# Patient Record
Sex: FEMALE | Race: ASIAN | NOT HISPANIC OR LATINO | Employment: FULL TIME | URBAN - METROPOLITAN AREA
[De-identification: names, ages, dates, MRNs, and addresses within clinical notes are randomized per-mention and may not be internally consistent; named-entity substitution may affect disease eponyms.]

---

## 2017-07-04 ENCOUNTER — OFFICE VISIT - HEALTHEAST (OUTPATIENT)
Dept: FAMILY MEDICINE | Facility: CLINIC | Age: 32
End: 2017-07-04

## 2017-07-04 DIAGNOSIS — R51.9 RIGHT FACIAL PAIN: ICD-10-CM

## 2018-03-26 ENCOUNTER — SURGERY - HEALTHEAST (OUTPATIENT)
Dept: OBGYN | Facility: CLINIC | Age: 33
End: 2018-03-26

## 2018-03-26 ENCOUNTER — ANESTHESIA - HEALTHEAST (OUTPATIENT)
Dept: SURGERY | Facility: CLINIC | Age: 33
End: 2018-03-26

## 2018-03-26 ENCOUNTER — SURGERY - HEALTHEAST (OUTPATIENT)
Dept: SURGERY | Facility: CLINIC | Age: 33
End: 2018-03-26

## 2018-03-26 ASSESSMENT — MIFFLIN-ST. JEOR: SCORE: 1636.62

## 2018-03-29 ENCOUNTER — HOME CARE/HOSPICE - HEALTHEAST (OUTPATIENT)
Dept: HOME HEALTH SERVICES | Facility: HOME HEALTH | Age: 33
End: 2018-03-29

## 2018-03-31 ENCOUNTER — HOME CARE/HOSPICE - HEALTHEAST (OUTPATIENT)
Dept: HOME HEALTH SERVICES | Facility: HOME HEALTH | Age: 33
End: 2018-03-31

## 2018-03-31 ENCOUNTER — COMMUNICATION - HEALTHEAST (OUTPATIENT)
Dept: SCHEDULING | Facility: CLINIC | Age: 33
End: 2018-03-31

## 2018-04-03 ENCOUNTER — COMMUNICATION - HEALTHEAST (OUTPATIENT)
Dept: OBGYN | Facility: CLINIC | Age: 33
End: 2018-04-03

## 2018-09-05 ENCOUNTER — OFFICE VISIT - HEALTHEAST (OUTPATIENT)
Dept: FAMILY MEDICINE | Facility: CLINIC | Age: 33
End: 2018-09-05

## 2018-09-05 DIAGNOSIS — N89.8 VAGINAL ITCHING: ICD-10-CM

## 2018-09-05 DIAGNOSIS — R30.9 PAINFUL URINATION: ICD-10-CM

## 2018-09-05 LAB
ALBUMIN UR-MCNC: NEGATIVE MG/DL
APPEARANCE UR: CLEAR
BACTERIA #/AREA URNS HPF: ABNORMAL HPF
BILIRUB UR QL STRIP: NEGATIVE
CLUE CELLS: NORMAL
COLOR UR AUTO: YELLOW
GLUCOSE UR STRIP-MCNC: NEGATIVE MG/DL
HGB UR QL STRIP: ABNORMAL
KETONES UR STRIP-MCNC: NEGATIVE MG/DL
LEUKOCYTE ESTERASE UR QL STRIP: ABNORMAL
NITRATE UR QL: NEGATIVE
PH UR STRIP: 7 [PH] (ref 5–8)
RBC #/AREA URNS AUTO: ABNORMAL HPF
SP GR UR STRIP: 1.01 (ref 1–1.03)
SQUAMOUS #/AREA URNS AUTO: ABNORMAL LPF
TRICHOMONAS, WET PREP: NORMAL
UROBILINOGEN UR STRIP-ACNC: ABNORMAL
WBC #/AREA URNS AUTO: ABNORMAL HPF
YEAST, WET PREP: NORMAL

## 2018-09-07 ENCOUNTER — COMMUNICATION - HEALTHEAST (OUTPATIENT)
Dept: FAMILY MEDICINE | Facility: CLINIC | Age: 33
End: 2018-09-07

## 2018-09-07 ENCOUNTER — AMBULATORY - HEALTHEAST (OUTPATIENT)
Dept: FAMILY MEDICINE | Facility: CLINIC | Age: 33
End: 2018-09-07

## 2018-09-07 DIAGNOSIS — R30.9 PAINFUL URINATION: ICD-10-CM

## 2018-09-08 LAB — BACTERIA SPEC CULT: ABNORMAL

## 2018-09-09 ENCOUNTER — COMMUNICATION - HEALTHEAST (OUTPATIENT)
Dept: FAMILY MEDICINE | Facility: CLINIC | Age: 33
End: 2018-09-09

## 2018-09-10 ENCOUNTER — OFFICE VISIT - HEALTHEAST (OUTPATIENT)
Dept: FAMILY MEDICINE | Facility: CLINIC | Age: 33
End: 2018-09-10

## 2018-09-10 DIAGNOSIS — N39.0 URINARY TRACT INFECTION: ICD-10-CM

## 2018-09-10 DIAGNOSIS — R30.9 PAINFUL URINATION: ICD-10-CM

## 2018-09-19 ENCOUNTER — AMBULATORY - HEALTHEAST (OUTPATIENT)
Dept: LAB | Facility: CLINIC | Age: 33
End: 2018-09-19

## 2018-09-19 DIAGNOSIS — N39.0 URINARY TRACT INFECTION: ICD-10-CM

## 2018-09-20 LAB — BACTERIA SPEC CULT: NO GROWTH

## 2018-10-04 ENCOUNTER — OFFICE VISIT - HEALTHEAST (OUTPATIENT)
Dept: FAMILY MEDICINE | Facility: CLINIC | Age: 33
End: 2018-10-04

## 2018-10-04 DIAGNOSIS — K62.5 RECTAL BLEEDING: ICD-10-CM

## 2018-10-09 ENCOUNTER — RECORDS - HEALTHEAST (OUTPATIENT)
Dept: ADMINISTRATIVE | Facility: OTHER | Age: 33
End: 2018-10-09

## 2018-11-20 ENCOUNTER — OFFICE VISIT - HEALTHEAST (OUTPATIENT)
Dept: FAMILY MEDICINE | Facility: CLINIC | Age: 33
End: 2018-11-20

## 2018-11-20 DIAGNOSIS — R10.11 RUQ ABDOMINAL PAIN: ICD-10-CM

## 2018-11-20 DIAGNOSIS — R10.9 FLANK PAIN: ICD-10-CM

## 2018-11-21 ENCOUNTER — HOSPITAL ENCOUNTER (OUTPATIENT)
Dept: CT IMAGING | Facility: CLINIC | Age: 33
Discharge: HOME | End: 2018-11-21
Attending: FAMILY MEDICINE

## 2018-11-21 ENCOUNTER — HOSPITAL ENCOUNTER (OUTPATIENT)
Dept: ULTRASOUND IMAGING | Facility: CLINIC | Age: 33
Discharge: HOME | End: 2018-11-21
Attending: FAMILY MEDICINE

## 2018-11-21 DIAGNOSIS — R10.11 RUQ ABDOMINAL PAIN: ICD-10-CM

## 2018-11-21 DIAGNOSIS — R10.9 FLANK PAIN: ICD-10-CM

## 2019-02-05 ENCOUNTER — OFFICE VISIT - HEALTHEAST (OUTPATIENT)
Dept: FAMILY MEDICINE | Facility: CLINIC | Age: 34
End: 2019-02-05

## 2019-02-05 DIAGNOSIS — H92.01 RIGHT EAR PAIN: ICD-10-CM

## 2019-03-27 ENCOUNTER — COMMUNICATION - HEALTHEAST (OUTPATIENT)
Dept: FAMILY MEDICINE | Facility: CLINIC | Age: 34
End: 2019-03-27

## 2019-03-27 ENCOUNTER — OFFICE VISIT - HEALTHEAST (OUTPATIENT)
Dept: FAMILY MEDICINE | Facility: CLINIC | Age: 34
End: 2019-03-27

## 2019-03-27 DIAGNOSIS — R30.0 DYSURIA: ICD-10-CM

## 2019-03-27 DIAGNOSIS — N30.00 ACUTE CYSTITIS WITHOUT HEMATURIA: ICD-10-CM

## 2019-03-27 LAB
ALBUMIN UR-MCNC: NEGATIVE MG/DL
APPEARANCE UR: CLEAR
BACTERIA #/AREA URNS HPF: ABNORMAL HPF
BILIRUB UR QL STRIP: NEGATIVE
COLOR UR AUTO: YELLOW
GLUCOSE UR STRIP-MCNC: NEGATIVE MG/DL
HGB UR QL STRIP: ABNORMAL
KETONES UR STRIP-MCNC: NEGATIVE MG/DL
LEUKOCYTE ESTERASE UR QL STRIP: ABNORMAL
NITRATE UR QL: NEGATIVE
PH UR STRIP: 6.5 [PH] (ref 5–8)
RBC #/AREA URNS AUTO: ABNORMAL HPF
SP GR UR STRIP: <=1.005 (ref 1–1.03)
SQUAMOUS #/AREA URNS AUTO: ABNORMAL LPF
UROBILINOGEN UR STRIP-ACNC: ABNORMAL
WBC #/AREA URNS AUTO: ABNORMAL HPF

## 2019-03-28 LAB — BACTERIA SPEC CULT: NO GROWTH

## 2019-06-11 ENCOUNTER — OFFICE VISIT - HEALTHEAST (OUTPATIENT)
Dept: FAMILY MEDICINE | Facility: CLINIC | Age: 34
End: 2019-06-11

## 2019-06-11 DIAGNOSIS — J02.0 STREP THROAT: ICD-10-CM

## 2019-06-11 DIAGNOSIS — R07.0 THROAT PAIN: ICD-10-CM

## 2019-06-11 LAB — DEPRECATED S PYO AG THROAT QL EIA: ABNORMAL

## 2020-05-02 ENCOUNTER — OFFICE VISIT - HEALTHEAST (OUTPATIENT)
Dept: FAMILY MEDICINE | Facility: CLINIC | Age: 35
End: 2020-05-02

## 2020-05-02 DIAGNOSIS — K12.30 STOMATITIS AND MUCOSITIS: ICD-10-CM

## 2020-05-02 DIAGNOSIS — K12.1 STOMATITIS AND MUCOSITIS: ICD-10-CM

## 2020-08-20 ENCOUNTER — OFFICE VISIT - HEALTHEAST (OUTPATIENT)
Dept: FAMILY MEDICINE | Facility: CLINIC | Age: 35
End: 2020-08-20

## 2020-08-20 DIAGNOSIS — Z00.00 ROUTINE GENERAL MEDICAL EXAMINATION AT A HEALTH CARE FACILITY: ICD-10-CM

## 2020-08-20 LAB
CHOLEST SERPL-MCNC: 162 MG/DL
FASTING STATUS PATIENT QL REPORTED: YES
FASTING STATUS PATIENT QL REPORTED: YES
GLUCOSE BLD-MCNC: 97 MG/DL (ref 70–99)
HDLC SERPL-MCNC: 63 MG/DL
LDLC SERPL CALC-MCNC: 84 MG/DL
TRIGL SERPL-MCNC: 75 MG/DL

## 2020-08-20 ASSESSMENT — MIFFLIN-ST. JEOR: SCORE: 1480.47

## 2020-08-21 LAB
HPV SOURCE: NORMAL
HUMAN PAPILLOMA VIRUS 16 DNA: NEGATIVE
HUMAN PAPILLOMA VIRUS 18 DNA: NEGATIVE
HUMAN PAPILLOMA VIRUS FINAL DIAGNOSIS: NORMAL
HUMAN PAPILLOMA VIRUS OTHER HR: NEGATIVE
SPECIMEN DESCRIPTION: NORMAL

## 2020-08-28 ENCOUNTER — OFFICE VISIT - HEALTHEAST (OUTPATIENT)
Dept: FAMILY MEDICINE | Facility: CLINIC | Age: 35
End: 2020-08-28

## 2020-08-28 DIAGNOSIS — R42 DIZZINESS: ICD-10-CM

## 2020-08-28 LAB
ATRIAL RATE - MUSE: 75 BPM
DIASTOLIC BLOOD PRESSURE - MUSE: NORMAL
ERYTHROCYTE [DISTWIDTH] IN BLOOD BY AUTOMATED COUNT: 12.2 % (ref 11–14.5)
FASTING STATUS PATIENT QL REPORTED: NO
GLUCOSE BLD-MCNC: 109 MG/DL (ref 74–125)
HCT VFR BLD AUTO: 37.5 % (ref 35–47)
HGB BLD-MCNC: 12.4 G/DL (ref 12–16)
INTERPRETATION ECG - MUSE: NORMAL
MCH RBC QN AUTO: 26.1 PG (ref 27–34)
MCHC RBC AUTO-ENTMCNC: 33.2 G/DL (ref 32–36)
MCV RBC AUTO: 79 FL (ref 80–100)
P AXIS - MUSE: 69 DEGREES
PLATELET # BLD AUTO: 318 THOU/UL (ref 140–440)
PMV BLD AUTO: 7.7 FL (ref 7–10)
PR INTERVAL - MUSE: 136 MS
QRS DURATION - MUSE: 90 MS
QT - MUSE: 384 MS
QTC - MUSE: 428 MS
R AXIS - MUSE: 88 DEGREES
RBC # BLD AUTO: 4.76 MILL/UL (ref 3.8–5.4)
SYSTOLIC BLOOD PRESSURE - MUSE: NORMAL
T AXIS - MUSE: 53 DEGREES
VENTRICULAR RATE- MUSE: 75 BPM
WBC: 6.6 THOU/UL (ref 4–11)

## 2020-09-17 ENCOUNTER — OFFICE VISIT - HEALTHEAST (OUTPATIENT)
Dept: FAMILY MEDICINE | Facility: CLINIC | Age: 35
End: 2020-09-17

## 2020-09-17 DIAGNOSIS — R42 DIZZINESS: ICD-10-CM

## 2020-09-17 LAB
ERYTHROCYTE [DISTWIDTH] IN BLOOD BY AUTOMATED COUNT: 11.7 % (ref 11–14.5)
FERRITIN SERPL-MCNC: 8 NG/ML (ref 10–130)
HCT VFR BLD AUTO: 37.7 % (ref 35–47)
HGB BLD-MCNC: 12.5 G/DL (ref 12–16)
MCH RBC QN AUTO: 25.9 PG (ref 27–34)
MCHC RBC AUTO-ENTMCNC: 33 G/DL (ref 32–36)
MCV RBC AUTO: 78 FL (ref 80–100)
PLATELET # BLD AUTO: 295 THOU/UL (ref 140–440)
PMV BLD AUTO: 7.7 FL (ref 7–10)
RBC # BLD AUTO: 4.81 MILL/UL (ref 3.8–5.4)
VIT B12 SERPL-MCNC: 303 PG/ML (ref 213–816)
WBC: 5.1 THOU/UL (ref 4–11)

## 2020-09-18 ENCOUNTER — COMMUNICATION - HEALTHEAST (OUTPATIENT)
Dept: SCHEDULING | Facility: CLINIC | Age: 35
End: 2020-09-18

## 2020-12-03 ENCOUNTER — OFFICE VISIT - HEALTHEAST (OUTPATIENT)
Dept: FAMILY MEDICINE | Facility: CLINIC | Age: 35
End: 2020-12-03

## 2020-12-03 DIAGNOSIS — D51.3 OTHER DIETARY VITAMIN B12 DEFICIENCY ANEMIA: ICD-10-CM

## 2020-12-03 LAB
FERRITIN SERPL-MCNC: 51 NG/ML (ref 10–130)
VIT B12 SERPL-MCNC: 1926 PG/ML (ref 213–816)

## 2020-12-04 ENCOUNTER — COMMUNICATION - HEALTHEAST (OUTPATIENT)
Dept: FAMILY MEDICINE | Facility: CLINIC | Age: 35
End: 2020-12-04

## 2020-12-08 ENCOUNTER — COMMUNICATION - HEALTHEAST (OUTPATIENT)
Dept: FAMILY MEDICINE | Facility: CLINIC | Age: 35
End: 2020-12-08

## 2020-12-11 ENCOUNTER — COMMUNICATION - HEALTHEAST (OUTPATIENT)
Dept: SCHEDULING | Facility: CLINIC | Age: 35
End: 2020-12-11

## 2020-12-16 ENCOUNTER — OFFICE VISIT - HEALTHEAST (OUTPATIENT)
Dept: FAMILY MEDICINE | Facility: CLINIC | Age: 35
End: 2020-12-16

## 2020-12-16 DIAGNOSIS — H81.11 BENIGN PAROXYSMAL POSITIONAL VERTIGO OF RIGHT EAR: ICD-10-CM

## 2020-12-16 DIAGNOSIS — F41.9 ANXIETY: ICD-10-CM

## 2020-12-16 RX ORDER — MECLIZINE HYDROCHLORIDE 25 MG/1
25 TABLET ORAL 3 TIMES DAILY PRN
Qty: 30 TABLET | Refills: 1 | Status: SHIPPED | OUTPATIENT
Start: 2020-12-16 | End: 2021-09-27

## 2021-05-27 NOTE — TELEPHONE ENCOUNTER
Call placed to patient, advised appt for evaluation. She will go to [facility].  Beatriz Beltran [facility] CMT

## 2021-05-27 NOTE — PROGRESS NOTES
Assessment/Plan:   Dysuria  Acute cystitis without hematuria  Worsening sxs of acute cystitis without sign of systemic or complicated infection. UA consistent  I discussed red flag symptoms, return precautions to clinic/ER and follow up care with patient/parent.  Expected clinical course, symptomatic cares advised. Questions answered. Patient/parent amenable with plan.  - Urinalysis-UC if Indicated  - Culture, Urine  - sulfamethoxazole-trimethoprim (BACTRIM DS) 800-160 mg per tablet; Take 1 tablet by mouth 2 (two) times a day for 10 days.  Dispense: 20 tablet; Refill: 0    Urine culture is pending - we will call if a change is needed  Continue to drink water, sitz baths, vaseline  BActrim DS (sulfa-trim) twice a day for 7-10 days  Yogurt or probiotics  Pyridium or Azo brand urinary anesthetic may help with pain until antibiotic kicks in. Ask the pharmacist for help finding it if needed    Subjective:      George Hill is a 33 y.o. female who presents with dysuria. She has noticed mild burning at the end of urination for 3-4 days, it is now getting worse. She is also now experiencing more frequency and urgency of urination. No blood. No vaginal sxs. LMP 3/9/19, she is s/p TL and denies possibility of pregnacy. No fever or chills, no flank pain or abdominal pain. No N/V/D or constipation. She otherwise feels well. No-smoker.     No Known Allergies    Current Outpatient Medications on File Prior to Visit   Medication Sig Dispense Refill     cholecalciferol, vitamin D3, 2,000 unit Tab Take 2,000 Units by mouth.       No current facility-administered medications on file prior to visit.      There is no problem list on file for this patient.      Objective:     /75 (Patient Site: Right Arm, Patient Position: Sitting, Cuff Size: Adult Regular)   Pulse 73   Temp 98.1  F (36.7  C) (Oral)   Resp 18   Wt 163 lb (73.9 kg)   LMP 03/09/2019   SpO2 100%   BMI 24.78 kg/m      Physical  General Appearance: Alert,  cooperative, no distress  Head: Normocephalic, without obvious abnormality, atraumatic  Eyes: Conjunctivae are normal.   Nose: No significant congestion.  Abdomen: Soft, non-tender, no CVA tenderness with percussion  Skin: no rashes or lesions  Psychiatric: Patient has a normal mood and affect.        Recent Results (from the past 24 hour(s))   Urinalysis-UC if Indicated   Result Value Ref Range    Color, UA Yellow Colorless, Yellow, Straw, Light Yellow    Clarity, UA Clear Clear    Glucose, UA Negative Negative    Bilirubin, UA Negative Negative    Ketones, UA Negative Negative    Specific Gravity, UA <=1.005 1.005 - 1.030    Blood, UA Small (!) Negative    pH, UA 6.5 5.0 - 8.0    Protein, UA Negative Negative mg/dL    Urobilinogen, UA 0.2 E.U./dL 0.2 E.U./dL, 1.0 E.U./dL    Nitrite, UA Negative Negative    Leukocytes, UA Small (!) Negative    Bacteria, UA None Seen None Seen hpf    RBC, UA None Seen None Seen, 0-2 hpf    WBC, UA 0-5 None Seen, 0-5 hpf    Squam Epithel, UA 0-5 None Seen, 0-5 lpf

## 2021-05-27 NOTE — PATIENT INSTRUCTIONS - HE
Urine culture is pending - we will call if a change is needed  Continue to drink water, sitz baths, vaseline  BActrim DS (sulfa-trim) twice a day for 7-10 days  Yogurt or probiotics  Pyridium or Azo brand urinary anesthetic may help with pain until antibiotic kicks in. Ask the pharmacist for help finding it if needed

## 2021-05-27 NOTE — TELEPHONE ENCOUNTER
Orders being requested: UA/UC   Reason service is needed/diagnosis: burning with urination an urgency,  hurts to urinate, x 1 day , Please call and advise   When are orders needed by: ASAP- TODAY   Where to send Orders: EPIC - lab order future  Okay to leave detailed message?  Yes

## 2021-05-31 VITALS — BODY MASS INDEX: 24.58 KG/M2 | WEIGHT: 159.3 LBS

## 2021-06-01 VITALS — BODY MASS INDEX: 30.01 KG/M2 | WEIGHT: 198 LBS | HEIGHT: 68 IN

## 2021-06-02 VITALS — WEIGHT: 171.1 LBS | BODY MASS INDEX: 26.02 KG/M2

## 2021-06-02 VITALS — BODY MASS INDEX: 26.67 KG/M2 | WEIGHT: 175.38 LBS

## 2021-06-02 VITALS — WEIGHT: 173 LBS | BODY MASS INDEX: 26.3 KG/M2

## 2021-06-02 VITALS — BODY MASS INDEX: 24.78 KG/M2 | WEIGHT: 163 LBS

## 2021-06-02 VITALS — WEIGHT: 170.8 LBS | BODY MASS INDEX: 25.97 KG/M2

## 2021-06-02 VITALS — WEIGHT: 175 LBS | BODY MASS INDEX: 26.61 KG/M2

## 2021-06-03 VITALS — BODY MASS INDEX: 24.18 KG/M2 | WEIGHT: 159 LBS

## 2021-06-04 VITALS
OXYGEN SATURATION: 100 % | RESPIRATION RATE: 16 BRPM | BODY MASS INDEX: 24.94 KG/M2 | TEMPERATURE: 98.7 F | SYSTOLIC BLOOD PRESSURE: 119 MMHG | WEIGHT: 164 LBS | HEART RATE: 98 BPM | DIASTOLIC BLOOD PRESSURE: 78 MMHG

## 2021-06-04 VITALS
HEART RATE: 88 BPM | WEIGHT: 162.7 LBS | BODY MASS INDEX: 24.66 KG/M2 | DIASTOLIC BLOOD PRESSURE: 74 MMHG | HEIGHT: 68 IN | SYSTOLIC BLOOD PRESSURE: 108 MMHG | OXYGEN SATURATION: 100 %

## 2021-06-04 VITALS
OXYGEN SATURATION: 98 % | BODY MASS INDEX: 24.75 KG/M2 | TEMPERATURE: 98.8 F | WEIGHT: 164 LBS | SYSTOLIC BLOOD PRESSURE: 112 MMHG | RESPIRATION RATE: 17 BRPM | DIASTOLIC BLOOD PRESSURE: 75 MMHG | HEART RATE: 72 BPM

## 2021-06-05 VITALS
TEMPERATURE: 98.2 F | BODY MASS INDEX: 24.9 KG/M2 | WEIGHT: 165 LBS | HEART RATE: 84 BPM | DIASTOLIC BLOOD PRESSURE: 74 MMHG | SYSTOLIC BLOOD PRESSURE: 108 MMHG | OXYGEN SATURATION: 100 %

## 2021-06-05 VITALS
HEART RATE: 88 BPM | DIASTOLIC BLOOD PRESSURE: 70 MMHG | BODY MASS INDEX: 24.99 KG/M2 | SYSTOLIC BLOOD PRESSURE: 106 MMHG | WEIGHT: 165.56 LBS | OXYGEN SATURATION: 100 %

## 2021-06-05 VITALS
BODY MASS INDEX: 25.08 KG/M2 | TEMPERATURE: 98.3 F | OXYGEN SATURATION: 100 % | SYSTOLIC BLOOD PRESSURE: 104 MMHG | WEIGHT: 166.19 LBS | DIASTOLIC BLOOD PRESSURE: 62 MMHG | HEART RATE: 85 BPM

## 2021-06-09 ENCOUNTER — OFFICE VISIT - HEALTHEAST (OUTPATIENT)
Dept: FAMILY MEDICINE | Facility: CLINIC | Age: 36
End: 2021-06-09

## 2021-06-09 DIAGNOSIS — R30.0 DYSURIA: ICD-10-CM

## 2021-06-09 LAB
ALBUMIN UR-MCNC: NEGATIVE G/DL
APPEARANCE UR: CLEAR
BILIRUB UR QL STRIP: NEGATIVE
COLOR UR AUTO: YELLOW
GLUCOSE UR STRIP-MCNC: NEGATIVE MG/DL
HGB UR QL STRIP: NEGATIVE
KETONES UR STRIP-MCNC: NEGATIVE MG/DL
LEUKOCYTE ESTERASE UR QL STRIP: NEGATIVE
NITRATE UR QL: NEGATIVE
PH UR STRIP: 7 [PH] (ref 5–8)
SP GR UR STRIP: 1.01 (ref 1–1.03)
UROBILINOGEN UR STRIP-ACNC: NORMAL

## 2021-06-10 LAB — BACTERIA SPEC CULT: NO GROWTH

## 2021-06-11 NOTE — PROGRESS NOTES
Assessment & Plan:       1. Dizziness  Electrocardiogram Perform and Read    Glucose    HM2(CBC w/o Differential)    fluticasone propionate (FLONASE) 50 mcg/actuation nasal spray      Medical Decision Making  Patient presents with acute onset dizziness for 2 to 3 days most likely secondary to sinus congestion versus eustachian tube dysfunction.  Treat patient with a short course of nasal steroids and over-the-counter oral decongestants.  Had initial concerns for possible cardiac etiology versus BPPV versus glycemia versus anemia.  ECG showed normal sinus rhythm.  Rome-Hallpike maneuver was negative bilaterally and patient showed no signs of nystagmus on physical exam.  Did labs which showed normal glucose levels and normal hemoglobin.  Patient further has no signs of infection with no signs of otitis media and no fevers.  Discussed signs of worsening symptoms and when to follow-up with PCP if no symptom improvement.      Patient Instructions   You were seen today for sinus congestion and/or pain. This is likely due to a viral illness.    Symptoms management:  - May use Tylenol or Ibuprofen for discomfort and/or fever if present  - May try saline irrigation to relieve congestion (see instructions below)  - Use of nasal steroids (Flonase) as prescribed  - If you are experiencing ear fullness, may try an oral decongestant such as sudafed    Reasons to come back for re-evaluation:  - Develop a fever of 100.4F or current fever worsens  - Sudden and severe pain in the face and head  - Troubles seeing or double vision  - Swelling or redness around one or both eyes  - Sfiff neck  - Symptoms have not improved after 7 days    Buffered normal saline nasal irrigation   The benefits   1. Saline (saltwater) washes the mucus and irritants from your nose.   2. The sinus passages are moisturized.   3. Studies have also shown that a nasal irrigation improves cell function (the cells that move the mucus work better).   The recipe    Use a one-quart glass jar that is thoroughly cleansed.   You may use a large medical syringe (30 cc), water pick with an irrigation tip (preferred method), squeeze bottle, or Neti pot. Do not use a baby bulb syringe. The syringe or pick should be sterilized frequently or replaced every two to three weeks to avoid contamination and infection.   Fill with water that has been distilled, previously boiled, or otherwise sterilized. Plain tap water is not recommended, because it is not necessarily sterile.   Add 1 to 1  heaping teaspoons of pickling/anabel salt. Do not use table salt, because it contains a large number of additives.   Add 1 teaspoon of baking soda (pure bicarbonate).   Mix ingredients together, and store at room temperature. Discard after one week.   You may also make up a solution from premixed packets that are commercially prepared specifically for nasal irrigation.   The instructions   Irrigate your nose with saline one to two times per day.   If you have been told to use nasal medication, you should always use your saline solution first. The nasal medication is much more effective when sprayed onto clean nasal membranes, and the spray will reach deeper into the nose.   Pour the amount of fluid you plan to use into a clean bowl. Do not put your used syringe back into the storage container, because it contaminates your solution.   You may warm the solution slightly in the microwave, but be sure that the solution is not hot.   Bend over the sink (some people do this in the shower) and squirt the solution into each side of your nose, aiming the stream toward the back of your head, not the top of your head. The solution should flow into one nostril and out of the other, but it will not harm you if you swallow a little.   Some people experience a little burning sensation the first few times they use buffered saline solution, but this usually goes away after they adapt to it.             Subjective:      "  George Hill is a 35 y.o. female here for evaluation of dizziness.  Onset of symptoms was 3 days ago.  Patient describes dizziness as imbalance.  However, she has no difficulties walking.  She denies lightheadedness and room spinning sensation.  She states that the sensation is \"always there\".  She notes that leaning forward makes her symptoms worse.  Associated symptoms include pressure and pain in the right ear.  Patient otherwise denies fevers, headaches, sinus congestion, vision changes, shortness of breath, and chest pains.  She has had these symptoms before that self resolved in 24 hours.  She has never been evaluated for the symptoms.    The following portions of the patient's history were reviewed and updated as appropriate: allergies, current medications and problem list.    Review of Systems  A 12 point comprehensive review of systems was negative except as noted.     Allergies  No Known Allergies    Family History   Problem Relation Age of Onset     Colon cancer Mother      Brain cancer Father        Social History     Socioeconomic History     Marital status:      Spouse name: None     Number of children: None     Years of education: None     Highest education level: None   Occupational History     None   Social Needs     Financial resource strain: None     Food insecurity     Worry: None     Inability: None     Transportation needs     Medical: None     Non-medical: None   Tobacco Use     Smoking status: Never Smoker     Smokeless tobacco: Never Used   Substance and Sexual Activity     Alcohol use: No     Drug use: No     Sexual activity: Yes     Partners: Male   Lifestyle     Physical activity     Days per week: None     Minutes per session: None     Stress: None   Relationships     Social connections     Talks on phone: None     Gets together: None     Attends [employer] service: None     Active member of club or organization: None     Attends meetings of clubs or organizations: None     " Relationship status: None     Intimate partner violence     Fear of current or ex partner: None     Emotionally abused: None     Physically abused: None     Forced sexual activity: None   Other Topics Concern     None   Social History Narrative     None         Objective:       /75   Pulse 72   Temp 98.8  F (37.1  C)   Resp 17   Wt 164 lb (74.4 kg)   LMP 07/31/2020   SpO2 98%   Breastfeeding No   BMI 24.75 kg/m    General appearance: alert, appears stated age, cooperative, no distress and non-toxic  Head: Normocephalic, without obvious abnormality, atraumatic  Ears: TMs intact with serous fluid and moderate bulging bilaterally, no erythema; external ears normal  Nose: no discharge  Throat: lips, mucosa, and tongue normal; teeth and gums normal  Neck: no adenopathy and supple, symmetrical, trachea midline  Lungs: clear to auscultation bilaterally  Heart: regular rate and rhythm, S1, S2 normal, no murmur, click, rub or gallop  Neurologic: Alert and oriented X 3, normal strength and tone. Normal symmetric reflexes. Normal coordination and gait.  Maximiliano-Hallpike maneuver was negative bilaterally.     Lab Results    Recent Results (from the past 24 hour(s))   Electrocardiogram Perform and Read   Result Value Ref Range    SYSTOLIC BLOOD PRESSURE      DIASTOLIC BLOOD PRESSURE      VENTRICULAR RATE 75 BPM    ATRIAL RATE 75 BPM    P-R INTERVAL 136 ms    QRS DURATION 90 ms    Q-T INTERVAL 384 ms    QTC CALCULATION (BEZET) 428 ms    P Axis 69 degrees    R AXIS 88 degrees    T AXIS 53 degrees    MUSE DIAGNOSIS       Normal sinus rhythm  Normal ECG  No previous ECGs available     Glucose   Result Value Ref Range    Glucose 109 74 - 125 mg/dL    Patient Fasting > 8hrs? No    HM2(CBC w/o Differential)   Result Value Ref Range    WBC 6.6 4.0 - 11.0 thou/uL    RBC 4.76 3.80 - 5.40 mill/uL    Hemoglobin 12.4 12.0 - 16.0 g/dL    Hematocrit 37.5 35.0 - 47.0 %    MCV 79 (L) 80 - 100 fL    MCH 26.1 (L) 27.0 - 34.0 pg    MCHC  33.2 32.0 - 36.0 g/dL    RDW 12.2 11.0 - 14.5 %    Platelets 318 140 - 440 thou/uL    MPV 7.7 7.0 - 10.0 fL     I personally reviewed these results and discussed findings with the patient.

## 2021-06-11 NOTE — PROGRESS NOTES
"ASSESSMENT/PLAN:  George was seen today for dizziness.    Diagnoses and all orders for this visit:    Dizziness in vegetarian  Likely B12 deficiency and/or anemia  Will check labs  Advised daily B12 1000mcg   F/u in 1 month  -     HM2(CBC w/o Differential)  -     Ferritin  -     Vitamin B12    SUBJECTIVE:    George Hill is a 35 y.o. female who came in today     Dizziness \"slight spinning sensation\" even when sitting  Started 4 weeks ago.  Had 4-5 days free of symptoms but since then has been daily  Better at night  Not associated with eating  Mild nausea  Appetite is ok, no vomiting  Dizziness is manageable but not normal  Right side of face is sensitive, not painful.  Wants right ear checked.  No ear pain. Hearing ok  More burping after eating food and more farting  No change in diet.  Vegetarian diet.  No new medications, no new supplements  Was seen [facility] 8/28/20, given flonase, which she hasn't found to be helpful.  CBC showed MCV 79 and normal glucose   Tried meclizine and wasn't helpful  Tried OTC B12 (suggested by friend) and wasn't helpful  LMP was recently but can't recall exact date    Review of Systems (except those mentioned above)  Constitutional: Negative.   HENT: Negative.   Eyes: Negative.   Respiratory: Negative.   Cardiovascular: Negative.   Gastrointestinal: Negative.   Endocrine: Negative.   Genitourinary: Negative.   Musculoskeletal: Negative.   Skin: Negative.   Allergic/Immunologic: Negative.   Neurological: Negative.   Hematological: Negative.   Psychiatric/Behavioral: Negative.     There are no active problems to display for this patient.    No Known Allergies  Current Outpatient Medications   Medication Sig Dispense Refill     cholecalciferol, vitamin D3, 2,000 unit Tab Take 2,000 Units by mouth.       acetaminophen (TYLENOL) 325 MG tablet Take 650 mg by mouth every 6 (six) hours as needed for pain.       mecobalamin (B12 ACTIVE ORAL) Take by mouth.       multivitamin therapeutic " tablet Take 1 tablet by mouth daily.       No current facility-administered medications for this visit.      No past medical history on file.  Past Surgical History:   Procedure Laterality Date      SECTION, CLASSIC  2013     Social History     Socioeconomic History     Marital status:      Spouse name: None     Number of children: None     Years of education: None     Highest education level: None   Occupational History     None   Social Needs     Financial resource strain: None     Food insecurity     Worry: None     Inability: None     Transportation needs     Medical: None     Non-medical: None   Tobacco Use     Smoking status: Never Smoker     Smokeless tobacco: Never Used   Substance and Sexual Activity     Alcohol use: No     Drug use: No     Sexual activity: Yes     Partners: Male   Lifestyle     Physical activity     Days per week: None     Minutes per session: None     Stress: None   Relationships     Social connections     Talks on phone: None     Gets together: None     Attends Scientologist service: None     Active member of club or organization: None     Attends meetings of clubs or organizations: None     Relationship status: None     Intimate partner violence     Fear of current or ex partner: None     Emotionally abused: None     Physically abused: None     Forced sexual activity: None   Other Topics Concern     None   Social History Narrative     None     Family History   Problem Relation Age of Onset     Colon cancer Mother      Brain cancer Father          OBJECTIVE:    Vitals:    20 1221   BP: 106/70   Pulse: 88   SpO2: 100%   Weight: 165 lb 9 oz (75.1 kg)     Body mass index is 24.99 kg/m .    Physical Exam:  Constitutional: Patient was oriented to person, place, and time. Patient appeared well-developed and well-nourished. No distress.   Head: Normocephalic and atraumatic.   Right Ear: External ear normal. Normal TM  Left Ear: External ear normal. Normal TM  Nose: Nose normal.    Mouth/Throat: Oropharynx was clear and moist. No oropharyngeal exudate.   Eyes: Conjunctivae and EOM were normal. Pupils were equal, round, and reactive to light. Right eye exhibited no discharge. Left eye exhibited no discharge. No scleral icterus.   Neck: Neck supple. No JVD present. No tracheal deviation present. No thyromegaly present.   Lymphadenopathy:  Patient has no cervical adenopathy.   Cardiovascular: Normal rate, regular rhythm, normal heart sounds and intact distal pulses. No murmur heard.   Pulmonary/Chest: Effort normal and breath sounds normal. No stridor. No respiratory distress. Patient had no wheezes, no rales, exhibits no tenderness.   Neurological: Patient was alert and oriented to person, place, and time. Patient had normal reflexes. No cranial nerve deficit. Coordination normal. No nystagmus  Skin: Skin was warm and dry. No rash noted. Patient was not diaphoretic. No erythema. No pallor.       Results for orders placed or performed in visit on 09/17/20   HM2(CBC w/o Differential)   Result Value Ref Range    WBC 5.1 4.0 - 11.0 thou/uL    RBC 4.81 3.80 - 5.40 mill/uL    Hemoglobin 12.5 12.0 - 16.0 g/dL    Hematocrit 37.7 35.0 - 47.0 %    MCV 78 (L) 80 - 100 fL    MCH 25.9 (L) 27.0 - 34.0 pg    MCHC 33.0 32.0 - 36.0 g/dL    RDW 11.7 11.0 - 14.5 %    Platelets 295 140 - 440 thou/uL    MPV 7.7 7.0 - 10.0 fL

## 2021-06-11 NOTE — TELEPHONE ENCOUNTER
COVID 19 Nurse Triage Plan/Patient Instructions    Please be aware that novel coronavirus (COVID-19) may be circulating in the community. If you develop symptoms such as fever, cough, or SOB or if you have concerns about the presence of another infection including coronavirus (COVID-19), please contact your health care provider or visit [email or web address].     Disposition/Instructions    Home care recommended. Follow home care protocol based instructions.    Thank you for taking steps to prevent the spread of this virus.  o Limit your contact with others.  o Wear a simple mask to cover your cough.  o Wash your hands well and often.    Resources    [facility]: About COVID-19: [email or web address]    CDC: What to Do If You're Sick: www.cdc.gov/coronavirus/2019-ncov/about/steps-when-sick.html    CDC: Ending Home Isolation: www.cdc.gov/coronavirus/2019-ncov/hcp/disposition-in-home-patients.html     CDC: Caring for Someone: www.cdc.gov/coronavirus/2019-ncov/if-you-are-sick/care-for-someone.html     [facility]: Interim Guidance for Hospital Discharge to Home: www.[facility].[place]/diseases/coronavirus/hcp/hospdischarge.pdf    [facility] clinical trials (COVID-19 research studies): [email or web address].[email or web address]-clinical-trials     Below are the COVID-19 hotlines at the [place] Department [facility] ([facility]). Interpreters are available.   o For health questions: Call 759-965-4964 or 1-824.942.2093 (7 a.m. to 7 p.m.)  o For questions about schools and childcare: Call 891-477-4047 or 1-186.318.8211 (7 a.m. to 7 p.m.)         RN triage   Call from pt   Pt states she was seen in clinic this week for dizziness-- had labs drawn and requesting results  Reviewed labs and PCP advice   Pt states she also takes vit D -- no note in chart to change vit D -- pt will continue vit D - add B12 and iron  Also discussed iron and constipation and preventative measures  Pt will call back if further questions or changes in  symptoms   Malinda Guerrero RN [facility] RN triage        Reason for Disposition    Information only question and nurse able to answer    Protocols used: INFORMATION ONLY CALL - NO TRIAGE-A-[place]

## 2021-06-11 NOTE — PATIENT INSTRUCTIONS - HE
You were seen today for sinus congestion and/or pain. This is likely due to a viral illness.    Symptoms management:  - May use Tylenol or Ibuprofen for discomfort and/or fever if present  - May try saline irrigation to relieve congestion (see instructions below)  - Use of nasal steroids (Flonase) as prescribed  - If you are experiencing ear fullness, may try an oral decongestant such as sudafed    Reasons to come back for re-evaluation:  - Develop a fever of 100.4F or current fever worsens  - Sudden and severe pain in the face and head  - Troubles seeing or double vision  - Swelling or redness around one or both eyes  - Sfiff neck  - Symptoms have not improved after 7 days    Buffered normal saline nasal irrigation   The benefits   1. Saline (saltwater) washes the mucus and irritants from your nose.   2. The sinus passages are moisturized.   3. Studies have also shown that a nasal irrigation improves cell function (the cells that move the mucus work better).   The recipe   Use a one-quart glass jar that is thoroughly cleansed.   You may use a large medical syringe (30 cc), water pick with an irrigation tip (preferred method), squeeze bottle, or Neti pot. Do not use a baby bulb syringe. The syringe or pick should be sterilized frequently or replaced every two to three weeks to avoid contamination and infection.   Fill with water that has been distilled, previously boiled, or otherwise sterilized. Plain tap water is not recommended, because it is not necessarily sterile.   Add 1 to 1  heaping teaspoons of pickling/anabel salt. Do not use table salt, because it contains a large number of additives.   Add 1 teaspoon of baking soda (pure bicarbonate).   Mix ingredients together, and store at room temperature. Discard after one week.   You may also make up a solution from premixed packets that are commercially prepared specifically for nasal irrigation.   The instructions   Irrigate your nose with saline one to two  times per day.   If you have been told to use nasal medication, you should always use your saline solution first. The nasal medication is much more effective when sprayed onto clean nasal membranes, and the spray will reach deeper into the nose.   Pour the amount of fluid you plan to use into a clean bowl. Do not put your used syringe back into the storage container, because it contaminates your solution.   You may warm the solution slightly in the microwave, but be sure that the solution is not hot.   Bend over the sink (some people do this in the shower) and squirt the solution into each side of your nose, aiming the stream toward the back of your head, not the top of your head. The solution should flow into one nostril and out of the other, but it will not harm you if you swallow a little.   Some people experience a little burning sensation the first few times they use buffered saline solution, but this usually goes away after they adapt to it.

## 2021-06-11 NOTE — PROGRESS NOTES
Chief Complaint   Patient presents with     Eye Pain     right eye is aching and red started 7/2       HPI    Patient is here for 2 days of intermittent discomfort/pain at right upper face between the right lateral corner of the right eye and the ear with intermittent slight redness of the right eye. No eye pain, discharge, visual changes, headache, fever, cough, neck pain. No contact lenses. No hx of shingles.     ROS: Pertinent ROS noted in HPI.     No Known Allergies      There is no problem list on file for this patient.      Family History   Problem Relation Age of Onset     Colon cancer Mother      Brain cancer Father        Social History     Social History     Marital status:      Spouse name: N/A     Number of children: N/A     Years of education: N/A     Occupational History     Not on file.     Social History Main Topics     Smoking status: Never Smoker     Smokeless tobacco: Never Used     Alcohol use Not on file     Drug use: Not on file     Sexual activity: Not on file     Other Topics Concern     Not on file     Social History Narrative       Objective:    Vitals:    07/04/17 0835   BP: 102/70   Pulse: 78   Temp: 98.2  F (36.8  C)   SpO2: 100%       Gen:NAD  Head: normal inspection, normal palpation  Ears: normal TMs and canals bilaterally  Eyes: normal conjunctiva, eyelids bilaterally, corneas grossly clear, PERRLA, EOMI  CV: RRR,no M, R, G  Pulm: CTAB  Skin: no rash    Impression:    Right facial pain - unclear etiology, discussed differentials (TMJ, herpes zoster vs others), normal exam today.    Plan:  Reassurances  Close monitoring  Close follow up as directed.

## 2021-06-13 NOTE — PROGRESS NOTES
ASSESSMENT/PLAN:  George was seen today for discuss iron deficiency.    Diagnoses and all orders for this visit:    Other dietary vitamin B12 deficiency anemia  -     Vitamin B12  -     Ferritin  Dizziness and paresthesia symptoms are much improved  Will check labs  Continue with 1000mcg [id number] and 325mg ferrous sulfate  Further recommendations will depend on lab results    SUBJECTIVE:    George Hill is a 35 y.o. female who came in today     Since taking B12 1000mcg daily and ferrous 325mg once daily  Dizziness is better, not as frequent  Facial sensitivity has resolved  Starting back on eating meat (chicken)    Review of Systems (except those mentioned above)  Constitutional: Negative.   HENT: Negative.   Eyes: Negative.   Respiratory: Negative.   Cardiovascular: Negative.   Gastrointestinal: Negative.   Endocrine: Negative.   Genitourinary: Negative.   Musculoskeletal: Negative.   Skin: Negative.   Allergic/Immunologic: Negative.   Neurological: Negative.   Hematological: Negative.   Psychiatric/Behavioral: Negative.     There are no active problems to display for this patient.    No Known Allergies  Current Outpatient Medications   Medication Sig Dispense Refill     cholecalciferol, vitamin D3, 2,000 unit Tab Take 2,000 Units by mouth.       ferrous sulfate 65 mg elemental iron Take 1 tablet by mouth daily with breakfast.       mecobalamin (B12 ACTIVE ORAL) Take by mouth. Taking 100 mcg       No current facility-administered medications for this visit.      No past medical history on file.  Past Surgical History:   Procedure Laterality Date      SECTION, CLASSIC  2013     Social History     Socioeconomic History     Marital status:      Spouse name: None     Number of children: None     Years of education: None     Highest education level: None   Occupational History     None   Social Needs     Financial resource strain: None     Food insecurity     Worry: None     Inability: None      Transportation needs     Medical: None     Non-medical: None   Tobacco Use     Smoking status: Never Smoker     Smokeless tobacco: Never Used   Substance and Sexual Activity     Alcohol use: No     Drug use: No     Sexual activity: Yes     Partners: Male   Lifestyle     Physical activity     Days per week: None     Minutes per session: None     Stress: None   Relationships     Social connections     Talks on phone: None     Gets together: None     Attends Judaism service: None     Active member of club or organization: None     Attends meetings of clubs or organizations: None     Relationship status: None     Intimate partner violence     Fear of current or ex partner: None     Emotionally abused: None     Physically abused: None     Forced sexual activity: None   Other Topics Concern     None   Social History Narrative     None     Family History   Problem Relation Age of Onset     Colon cancer Mother      Brain cancer Father          OBJECTIVE:    Vitals:    12/03/20 1339   BP: 104/62   Patient Site: Left Arm   Patient Position: Sitting   Cuff Size: Adult Regular   Pulse: 85   Temp: 98.3  F (36.8  C)   TempSrc: Oral   SpO2: 100%   Weight: 166 lb 3 oz (75.4 kg)     Body mass index is 25.08 kg/m .    Physical Exam:  Constitutional: Patient is oriented to person, place, and time. Patient appears well-developed and well-nourished. No distress.   Head: Normocephalic and atraumatic.   Right Ear: External ear normal.   Left Ear: External ear normal.   Nose: Nose normal.   Eyes: Conjunctivae and EOM are normal. Right eye exhibits no discharge. Left eye exhibits no discharge. No scleral icterus.   Neurological: Patient is alert and oriented to person, place, and time. No cranial nerve deficit. Coordination normal.   Skin: No rash noted. Patient is not diaphoretic. No erythema. No pallor.      Results for orders placed or performed in visit on 09/17/20   HM2(CBC w/o Differential)   Result Value Ref Range    WBC 5.1 4.0 -  11.0 thou/uL    RBC 4.81 3.80 - 5.40 mill/uL    Hemoglobin 12.5 12.0 - 16.0 g/dL    Hematocrit 37.7 35.0 - 47.0 %    MCV 78 (L) 80 - 100 fL    MCH 25.9 (L) 27.0 - 34.0 pg    MCHC 33.0 32.0 - 36.0 g/dL    RDW 11.7 11.0 - 14.5 %    Platelets 295 140 - 440 thou/uL    MPV 7.7 7.0 - 10.0 fL   Ferritin   Result Value Ref Range    Ferritin 8 (L) 10 - 130 ng/mL   Vitamin B12   Result Value Ref Range    Vitamin B-12 303 213 - 816 pg/mL

## 2021-06-13 NOTE — TELEPHONE ENCOUNTER
Who is calling:  Patient  Reason for Call:  She wants the doctor to know that before her appointment, she had taken B-12 1000 mcg.  Her lab work is abnormal.  Date of last appointment with primary care: Yesterday  Okay to leave a detailed message: Yes

## 2021-06-13 NOTE — TELEPHONE ENCOUNTER
Spoke to pt  Stay for 1000mcg and increase ferrous sulfate 325mg to two times a day  F/u in 3months

## 2021-06-13 NOTE — TELEPHONE ENCOUNTER
I called pt and gave recommendations as per Dr. Logan and pt states understanding. I asked if she had any questions regarding locations of WIC or ED/ER and pt states she is knows of the [facility]. No further questions at this time.

## 2021-06-13 NOTE — TELEPHONE ENCOUNTER
Test Results  Who is calling?:  Patient  Who ordered the test:    Romero Somers MD  Type of test: Lab  Date of test:  12/3/2020  Where was the test performed:    [facility]  What are your questions/concerns?:    Patient states she is still feeling dizzy.  She will be going to [place] for 3 months.  Patient is questioning if she should stay on the ferrous sulfate and mecobalamin at the same dosages as she doesn't think they are helping much.  This nurse did relay Provider message from labs on 12/3/2020.  Please reach out to patient and advise as she still had questions.  Okay to leave a detailed message?:  Yes

## 2021-06-13 NOTE — TELEPHONE ENCOUNTER
"Caller is Josiah -> currently with pt on speaker phone.    Pt has had multiple evals for \"dizziness and nausea.\"  OVs of 8/28/20, 9/17/20, 12/3/20.  Tried treating with iron, Vit B12.    Today states \"No improvement.\"  Today -> dizziness is Worsening.  Mild nausea persists.  No worse.  Still able to eat/drink.  Has urinated today.    Still able to walk -> \"however she has to hold her head while walking, and hold onto things.\"  No headache.  No history of hypertension.  However head is \"spinning badly.\"    Triage disposition is \"[facility].\"  No open appt slots with PCP or any provider at pt's primary clinic.  Pt and  therefore hope for advice:  ->  Could pt be squeezed in to clinic today, in-person or virtually?    Best phone # for patient -> [phone].    Radha Prasad RN  [facility] Triage     Reason for Disposition    Spinning or tilting sensation (vertigo) present now    Additional Information    Negative: Shock suspected (e.g., cold/pale/clammy skin, too weak to stand, low BP, rapid pulse)    Negative: Difficult to awaken or acting confused (e.g., disoriented, slurred speech)    Negative: Fainted, and still feels dizzy afterwards    Negative: SEVERE difficulty breathing (e.g., struggling for each breath, speaks in single words)    Negative: Overdose (accidental or intentional) of medications    Negative: New neurologic deficit that is present now: * Weakness of the face, arm, or leg on one side of the body * Numbness of the face, arm, or leg on one side of the body * Loss of speech or garbled speech    Negative: Heart beating < 50 beats per minute OR > 140 beats per minute    Negative: Sounds like a life-threatening emergency to the triager    Negative: Chest pain    Negative: Rectal bleeding, bloody stool, or tarry-black stool    Negative: Vomiting is the main symptom    Negative: Diarrhea is the main symptom    Negative: Headache is the main symptom    Negative: Heat exhaustion suspected " (i.e., dehydration from heat exposure)    Negative: Patient states that he/she is having an anxiety/panic attack    Negative: SEVERE dizziness (e.g., unable to stand, requires support to walk, feels like passing out now)    Negative: SEVERE headache or neck pain    Negative: Spinning or tilting sensation (vertigo) present now and one or more stroke risk factors (i.e., hypertension, diabetes, prior stroke/TIA, heart attack, age over 60) (Exception: prior physician evaluation for this AND no different/worse than usual)    Negative: Loss of vision or double vision    Negative: Extra heart beats OR irregular heart beating (i.e., 'palpitations')    Negative: Difficulty breathing    Negative: Drinking very little and has signs of dehydration (e.g., no urine > 12 hours, very dry mouth, very lightheaded)    Negative: Follows bleeding (e.g., stomach, rectum, vagina) (Exception: became dizzy from sight of small amount blood)    Negative: Patient sounds very sick or weak to the triager    Negative: Lightheadedness (dizziness) present now, after 2 hours of rest and fluids    Protocols used: DIZZINESS-A-OH      ____________________________    COVID 19 Nurse Triage Plan/Patient Instructions    Please be aware that novel coronavirus (COVID-19) may be circulating in the community. If you develop symptoms such as fever, cough, or SOB or if you have concerns about the presence of another infection including coronavirus (COVID-19), please contact your health care provider or visit [email or web address].     Disposition/Instructions    Additional COVID19 information to add for patients.   How can I protect others?  If you have symptoms (fever, cough, body aches or trouble breathing): Stay home and away from others (self-isolate) until:    At least 10 days have passed since your symptoms started, And     You ve had no fever--and no medicine that reduces fever--for 1 full day (24 hours), And      Your other symptoms have resolved (gotten  "better).     If you don t have symptoms, but a test showed that you have COVID-19 (you tested positive):    Stay home and away from others (self-isolate). Follow the tips under \"How do I self-isolate?\" below for 10 days (20 days if you have a weak immune system).    You don't need to be retested for COVID-19 before going back to school or work. As long as you're fever-free and feeling better, you can go back to school, work and other activities after waiting the 10 or 20 days.     How do I self-isolate?    Stay in your own room, even for meals. Use your own bathroom if you can.     Stay away from others in your home. No hugging, kissing or shaking hands. No visitors.    Don t go to work, school or anywhere else.     Clean  high touch  surfaces often (doorknobs, counters, handles, etc.). Use a household cleaning spray or wipes. You ll find a full list on the EPA website:  www.epa.gov/pesticide-registration/list-n-disinfectants-use-against-sars-cov-2.    Cover your mouth and nose with a mask, tissue or washcloth to avoid spreading germs.    Wash your hands and face often. Use soap and water.    Caregivers in these groups are at risk for severe illness due to COVID-19:  o People 65 years and older  o People who live in a nursing home or long-term care facility  o People with chronic disease (lung, heart, cancer, diabetes, kidney, liver, immunologic)  o People who have a weakened immune system, including those who:  - Are in cancer treatment  - Take medicine that weakens the immune system, such as corticosteroids  - Had a bone marrow or organ transplant  - Have an immune deficiency  - Have poorly controlled HIV or AIDS  - Are obese (body mass index of 40 or higher)  - Smoke regularly    Caregivers should wear gloves while washing dishes, handling laundry and cleaning bedrooms and bathrooms.    Use caution when washing and drying laundry: Don t shake dirty laundry, and use the warmest water setting that you can.    For " more tips, go to www.cdc.gov/coronavirus/2019-ncov/downloads/10Things.pdf.    How can I take care of myself?  1. Get lots of rest. Drink extra fluids (unless a doctor has told you not to).     2. Take Tylenol (acetaminophen) for fever or pain. If you have liver or kidney problems, ask your family doctor if it s okay to take Tylenol.     Adults can take either:     650 mg (two 325 mg pills) every 4 to 6 hours, or     1,000 mg (two 500 mg pills) every 8 hours as needed.     Note: Don t take more than 3,000 mg in one day.   Acetaminophen is found in many medicines (both prescribed and over-the-counter medicines). Read all labels to be sure you don t take too much.     For children, check the Tylenol bottle for the right dose. The dose is based on the child s age or weight.    3. If you have other health problems (like cancer, heart failure, an organ transplant or severe kidney disease): Call your specialty clinic if you don t feel better in the next 2 days.    4. Know when to call 911: Emergency warning signs include:    Trouble breathing or shortness of breath    Pain or pressure in the chest that doesn t go away    Feeling confused like you haven t felt before, or not being able to wake up    Bluish-colored lips or face    What are the symptoms of COVID-19?     The most common symptoms are cough, fever and trouble breathing.     Less common symptoms include body aches, chills, diarrhea (loose, watery poops), fatigue (feeling very tired), headache, runny nose, sore throat and loss of smell.    COVID-19 can cause severe coughing (bronchitis) and lung infection (pneumonia).    How does it spread?     The virus may spread when a person coughs or sneezes into the air. The virus can travel about 6 feet this way, and it can live on surfaces.      Common  (household disinfectants) will kill the virus.    Who is at risk?  Anyone can catch COVID-19 if they re around someone who has the virus.    How can others protect  themselves?     Stay away from people who have COVID-19 (or symptoms of COVID-19).    Wash hands often with soap and water. Or, use hand  with at least 60% alcohol.    Avoid touching the eyes, nose or mouth.     Wear a face mask when you go out in public, when sick or when caring for a sick person.    Where can I get more information?    [facility]: About COVID-19: [email or web address]    CDC: What to Do If You re Sick: www.cdc.gov/coronavirus/2019-ncov/about/steps-when-sick.html    CDC: Ending Home Isolation: www.cdc.gov/coronavirus/2019-ncov/hcp/disposition-in-home-patients.html     CDC: Caring for Someone: www.cdc.gov/coronavirus/2019-ncov/if-you-are-sick/care-for-someone.html     [facility]: Interim Guidance for Hospital Discharge to Home: www.health.[facility].mn./diseases/coronavirus/hcp/hospdischarge.pdf    [facility] clinical trials (COVID-19 research studies): clinicalaffairs.[facility]-clinical-trials     Below are the COVID-19 hotlines at the [place] [facility] ([facility]). Interpreters are available.   o For health questions: Call 621-879-4823 or 1-983.477.7220 (7 a.m. to 7 p.m.)  o For questions about schools and childcare: Call 724-142-7699 or 1-177.631.8277 (7 a.m. to 7 p.m.)              Thank you for taking steps to prevent the spread of this virus.  o Limit your contact with others.  o Wear a simple mask to cover your cough.  o Wash your hands well and often.    Resources    [facility]: About COVID-19: [email or web address]    CDC: What to Do If You're Sick: www.cdc.gov/coronavirus/2019-ncov/about/steps-when-sick.html    CDC: Ending Home Isolation: www.cdc.gov/coronavirus/2019-ncov/hcp/disposition-in-home-patients.html     CDC: Caring for Someone: www.cdc.gov/coronavirus/2019-ncov/if-you-are-sick/care-for-someone.html     [facility]: Interim Guidance for Hospital Discharge to Home:  www.health.[facility].mn.us/diseases/coronavirus/hcp/hospdischarge.pdf    [facility] clinical trials (COVID-19 research studies): clinicalaffairs.[facility]/umn-clinical-trials     Below are the COVID-19 hotlines at the [facility] ([facility]). Interpreters are available.   o For health questions: Call 981-205-1248 or 1-524.886.3703 (7 a.m. to 7 p.m.)  o For questions about schools and childcare: Call 253-389-6391 or 1-865.987.6756 (7 a.m. to 7 p.m.)

## 2021-06-16 NOTE — ANESTHESIA PROCEDURE NOTES
Peripheral Block    Patient location during procedure: OR  Start time: 3/26/2018 10:42 AM  End time: 3/26/2018 10:52 AM  post-op analgesia per surgeon order as noted in medical record  Staffing:  Performing  Anesthesiologist: VINCE MAYBERRY  Preanesthetic Checklist  Completed: patient identified, site marked, risks, benefits, and alternatives discussed, timeout performed, consent obtained, airway assessed, oxygen available, suction available, emergency drugs available and hand hygiene performed  Peripheral Block  Block type: other, TAP (Bilateral after CS)  Prep: ChloraPrep  Patient position: supine  Patient monitoring: blood pressure, heart rate, continuous pulse oximetry and cardiac monitor  Laterality: N/A (Bilateral)  Injection technique: ultrasound guided    Ultrasound used to visualize needle placement in proximity to nerve being blocked: yes     Sterile gel and probe cover used for ultrasound.    Needle  Needle type: echogenic   Needle gauge: 20G  Needle length: 6 in  no peripheral nerve catheter placed  Assessment  Injection assessment: no difficulty with injection, negative aspiration for heme, no paresthesia on injection and incremental injection  Additional Notes  BILATERAL

## 2021-06-16 NOTE — ANESTHESIA POSTPROCEDURE EVALUATION
Patient: George Hill   SECTION, REPEAT, WITH BILATERAL SALPINGECTOMY  Anesthesia type: spinal    Patient location: Labor and Delivery  Last vitals:   Vitals:    18 1812   BP:    Pulse: 81   Resp: 18   Temp: 37.2  C (98.9  F)   SpO2: 97%     Post vital signs: stable  Level of consciousness: awake and responds to simple questions  Post-anesthesia pain: pain controlled  Post-anesthesia nausea and vomiting: no  Pulmonary: unassisted, return to baseline  Cardiovascular: stable and blood pressure at baseline  Hydration: adequate  Anesthetic events: no    QCDR Measures:  ASA# 11 - Sherine-op Cardiac Arrest: ASA11B - Patient did NOT experience unanticipated cardiac arrest  ASA# 12 - Sherine-op Mortality Rate: ASA12B - Patient did NOT die  ASA# 13 - PACU Re-Intubation Rate: NA - No ETT / LMA used for case  ASA# 10 - Composite Anes Safety: ASA10A - No serious adverse event    Additional Notes:

## 2021-06-16 NOTE — H&P
HISTORY AND PHYSICAL UPDATE ADMISSION EXAM    Name: George Hill  YOB: 1985  Medical Record Number: [id number]    History of Present Illness: presents for repeat  section with bilateral tubal sterilization    Estimated Date of Delivery: 18    EGA 38w5d    OB History    Para Term  AB Living   2 1 1 0 0 1   SAB TAB Ectopic Multiple Live Births   0 0 0 0 1      # Outcome Date GA Lbr Barney/2nd Weight Sex Delivery Anes PTL Lv   2 Current            1 Term                    Prenatal Complications Prior      Exam:      LMP 2017    Fetal heart Rate Category 1  Contractions none    HEENT  NC/AT  Heart     RRR without murmurs  Lungs    CTAB  Abdomen   gravid, soft and NT/ND  Extremities  no edema or calf tenderness  Vaginal exam defererd    Assessment: For  section     Plan: Planned  Section: Indication prior  section, delcines trial of labor, bilateral tubal sterilization    Prenatal record reviewed.    Margoth Qureshi MD    3/26/2018   9:39 AM

## 2021-06-16 NOTE — ANESTHESIA PROCEDURE NOTES
Spinal Block    Patient location during procedure: OR  Start time: 3/26/2018 9:58 AM  End time: 3/26/2018 10:01 AM  Reason for block: primary anesthetic    Staffing:  Performing  Anesthesiologist: VINCE MAYBERRY    Preanesthetic Checklist  Completed: patient identified, risks, benefits, and alternatives discussed, timeout performed, consent obtained, airway assessed, oxygen available, suction available, emergency drugs available and hand hygiene performed  Spinal Block  Patient position: sitting  Prep: ChloraPrep and site prepped and draped  Patient monitoring: blood pressure, continuous pulse ox, cardiac monitor and heart rate  Approach: midline  Location: L3-4  Injection technique: single-shot  Needle type: pencil-tip   Needle gauge: 25 G

## 2021-06-16 NOTE — ANESTHESIA CARE TRANSFER NOTE
Last vitals:   Vitals:    03/26/18 1120   BP: 119/66   Pulse: 100   Resp: 16   Temp: 36.5  C (97.7  F)   SpO2: 98%     Patient's level of consciousness is awake  Spontaneous respirations: yes  Maintains airway independently: yes  Dentition unchanged: yes  Oropharynx: oropharynx clear of all foreign objects    QCDR Measures:  ASA# 20 - Surgical Safety Checklist: WHO surgical safety checklist completed prior to induction  PQRS# 430 - Adult PONV Prevention: 4558F - Pt received => 2 anti-emetic agents (different classes) preop & intraop  ASA# 8 - Peds PONV Prevention: NA - Not pediatric patient, not GA or 2 or more risk factors NOT present  PQRS# 424 - Sherine-op Temp Management: 4559F - At least one body temp DOCUMENTED => 35.5C or 95.9F within required timeframe  PQRS# 426 - PACU Transfer Protocol: - Transfer of care checklist used  ASA# 14 - Acute Post-op Pain: ASA14B - Patient did NOT experience pain >= 7 out of 10

## 2021-06-16 NOTE — ANESTHESIA PREPROCEDURE EVALUATION
Anesthesia Evaluation      Patient summary reviewed   No history of anesthetic complications     Airway   Mallampati: II  Neck ROM: full   Pulmonary - negative ROS and normal exam                          Cardiovascular - negative ROS and normal exam   Neuro/Psych - negative ROS     Endo/Other    (+) obesity, pregnant     GI/Hepatic/Renal - negative ROS           Dental - normal exam                        Anesthesia Plan  Planned anesthetic: spinal  Decadron, Zofran.  PE infusion.  Duramorph.  ASA 2     Anesthetic plan and risks discussed with: patient  Anesthesia plan special considerations: antiemetics,   Post-op plan: routine recovery

## 2021-06-17 NOTE — PATIENT INSTRUCTIONS - HE
Patient Instructions by Mikhail Girard PA-C at 6/11/2019  7:20 AM     Author: Mikhail Girard PA-C Service: -- Author Type: Physician Assistant    Filed: 6/11/2019  8:08 AM Encounter Date: 6/11/2019 Status: Signed    : Mikhail Girard PA-C (Physician Assistant)       Suggested increased rest increased fluids and bedside humidification  Over-the-counter Tylenol for comfort.  Follow packaging directions  Over-the-counter throat lozenges with benzocaine such as Cepacol may be used if indicated and is not a choking hazard based on age.  Follow packaging directions.  Do not overuse the benzocaine as it will dry the throat and make it uncomfortable.  Noncontagious after 24 hours on the antibiotic.  Change toothbrush out after 48 hours to avoid reinfecting the mouth.  Follow up with primary care provider if you do not get resolution with the course of treatment.  Return to walk-in care if complication or new symptoms arise in the interim.        Self-Care for Sore Throats  Sore throats happen for many reasons, such as colds, allergies, and infections caused by viruses or bacteria. In any case, your throat becomes red and sore. Your goal for self-care is to reduce your discomfort while giving your throat a chance to heal.    Moisten and soothe your throat  Tips include the following:    Try a sip of water first thing after waking up.    Keep your throat moist by drinking 6 or more glasses of clear liquids every day.    Run a cool-air humidifier in your room overnight.    Avoid cigarette smoke.     Suck on throat lozenges, cough drops, hard candy, ice chips, or frozen fruit-juice bars. Use the sugar-free versions if your diet or medical condition requires them.  Gargle to ease irritation  Gargling every hour or 2 can ease irritation. Try gargling with 1 of these solutions:    1/4 teaspoon of salt in 1/2 cup of warm water    An over-the-counter anesthetic gargle  Use medicine for more relief  Over-the-counter medicine can  reduce sore throat symptoms. Ask your pharmacist if you have questions about which medicine to use:    Ease pain with anesthetic sprays. Aspirin or an aspirin substitute also helps. Remember, never give aspirin to anyone 18 or younger, or if you are already taking blood thinners.     For sore throats caused by allergies, try antihistamines to block the allergic reaction.    Remember: unless a sore throat is caused by a bacterial infection, antibiotics wont help you.  Prevent future sore throats  Prevention tips include the following:    Stop smoking or reduce contact with secondhand smoke. Smoke irritates the tender throat lining.    Limit contact with pets and with allergy-causing substances, such as pollen and mold.    When youre around someone with a sore throat or cold, wash your hands often to keep viruses or bacteria from spreading.    Dont strain your vocal cords.  Call your healthcare provider  Contact your healthcare provider if you have:    A temperature over 101 F (38.3 C)    White spots on the throat    Great difficulty swallowing    Trouble breathing    A skin rash    Recent exposure to someone else with strep bacteria    Severe hoarseness and swollen glands in the neck or jaw   Date Last Reviewed: 8/1/2016 2000-2016 [employer]. [address] [place] [address], [place] [address]. All rights reserved. This information is not intended as a substitute for professional medical care. Always follow your healthcare professional's instructions.

## 2021-06-18 NOTE — PATIENT INSTRUCTIONS - HE
Patient Instructions by Mikhail Girard PA-C at 5/2/2020  2:30 PM     Author: Mikhail Girard PA-C Service: -- Author Type: Physician Assistant    Filed: 5/2/2020  3:14 PM Encounter Date: 5/2/2020 Status: Addendum    : Mikhail Girard PA-C (Physician Assistant)    Related Notes: Original Note by Mikhail Girard PA-C (Physician Assistant) filed at 5/2/2020  3:13 PM                 Patient Education     Stomatitis (Child)  Stomatitis is pain inside the mouth. It can involve open sores (canker sores) or redness and swelling. It occurs on the inside of the cheeks or on the tongue or gums. Stomatitis is more common in children, but it can occur at any age.  Causes  There are many causes of stomatitis, but the most common is viral infections. Other common causes are:    Injury or irritation of the mouth lining    Fungal or bacterial infections    Using tobacco    Irritating foods or chemicals, such as citrus fruit, toothpaste, or mouthwash    Lack of certain vitamins, including vitamins B and C    A weakened immune system  Symptoms  Stomatitis can result in a variety of symptoms, including:    Redness inside the mouth    Sores (ulcers) in the mouth    Pain or burning    Swelling    Fever  Treatment  For a viral infection, usually only the symptoms are treated. Antibiotics do not kill viruses and are not recommended for this condition. This infection should go away within 7 to 10 days.  Home care    Use a local numbing solution for pain relief. Ask the pharmacist for suggestions on which brand and strength is best for your child. You may apply this directly to the sores with a cotton swab or with your finger. Use the numbing solution just before meals if eating is a problem.    Older children may rinse their mouth with warm saltwater (  teaspoon of salt in 1 glass of warm water). Be certain they spit the rinse out and dont swallow it.    Feed your child a soft diet, along with plenty of fluids to prevent dehydration. If your  child doesn't want to eat solid foods, it's OK for a few days, as long as he or she drinks lots of fluids. Cool drinks and frozen treats are soothing. Avoid citrus juices (orange juice, lemonade, etc.) and salty or spicy foods. These may cause more pain in the mouth.    Follow the healthcare provider's instructions on the use of over-the-counter pain medicines such as acetaminophen for fever, fussiness, or pain. In infants older than 6 months, you may use children's ibuprofen. (Note: If your child has chronic liver or kidney disease or has ever had a stomach ulcer or gastrointestinal bleeding, talk with your [facility] healthcare provider before using these medicines.) Dont give aspirin to a child younger than age 19 unless directed by your catie provider. Taking aspirin can put your child at risk for Reye syndrome. This is a rare but very serious disorder. It most often affects the brain and the liver.    Children should stay home until their fever is gone and they are eating and drinking well.  Follow-up care  Follow up with your [facility] healthcare provider, or as advised.  If a culture was done, you will be notified if the treatment needs to be changed. You can call as directed for the results.  Call 911  Call 911 if any of these occur:    Trouble breathing    Inability to swallow    Extremes drowsiness or trouble waking up    Fainting or loss of consciousness    Rapid heart rate    Seizure    Stiff neck  When to seek medical advice  For a usually healthy child, call your child's healthcare provider right away if any of these occur:    Your child has a fever (see Fever and children, below).    Your child can't eat or drink due to mouth pain.    Your child shows unusual fussiness, drowsiness, or confusion.    Your child shows symptoms of dehydration, including no wet diapers for 8 hours, no tears when crying, sunken eyes, or a dry mouth.     Fever and children  Always use a digital thermometer to check your catie  temperature. Never use a mercury thermometer.  For infants and toddlers, be sure to use a rectal thermometer correctly. A rectal thermometer may accidentally poke a hole in (perforate) the rectum. It may also pass on germs from the stool. Always follow the product makers directions for proper use. If you dont feel comfortable taking a rectal temperature, use another method. When you talk to your catie healthcare provider, tell him or her which method you used to take your catie temperature.  Here are guidelines for fever temperature. Ear temperatures arent accurate before 6 months of age. Dont take an oral temperature until your child is at least 4 years old.  Infant under 3 months old:    Ask your catie healthcare provider how you should take the temperature.    Rectal or forehead (temporal artery) temperature of 100.4 F (38 C) or higher, or as directed by the provider    Armpit temperature of 99 F (37.2 C) or higher, or as directed by the provider  Child age 3 to 36 months:    Rectal, forehead, or ear temperature of 102 F (38.9 C) or higher, or as directed by the provider    Armpit (axillary) temperature of 101 F (38.3 C) or higher, or as directed by the provider  Child of any age:    Repeated temperature of 104 F (40 C) or higher, or as directed by the provider    Fever that lasts more than 24 hours in a child under 2 years old. Or a fever that lasts for 3 days in a child 2 years or older.   Date Last Reviewed: 11/1/2017 2000-2017 [employer]. [address] [place] [address], [place] [address]. All rights reserved. This information is not intended as a substitute for professional medical care. Always follow your healthcare professional's instructions.           Patient Education     Canker Sore    A canker sore (also called an aphthous ulcer) is a painful sore on the lining of the mouth. It is most painful during the first few days, and it lasts about 7 to 14 days before going away.  Causes  Canker  sores are not cold sores or fever blisters. They are not contagious, so they are not spread by contact. The exact cause of canker sores is not clear, but there are a number of things that can trigger them in different people.    Mild injury, such as biting the inside of the mouth, lip, or cheek, or dental procedures    Stress    Poor diet, or lack of certain nutrients, including B vitamins and iron    Foods that can irritate the mouth, including tomatoes, citrus fruits, and some nuts (foods that are acidic or contain bitter substances called tannins)    Irritating chemicals, such as those in some toothpastes and mouthwashes    Certain chronic illnesses  Symptoms  Canker sores are found on the lining of the mouth. They can be inside the cheeks or lips, on the roof of the mouth, at the base of the gums, on the tongue, or in the back of the throat. Canker sores typically have these characteristics:    Small, flat (not raised) sores    Can be white or yellowish bumps that are red around the edges or have a red halo    Usually small in size, roundish, and in groups    Accompanied by pain or burning  Canker sores don't leave a scar. But they usually come back.  Home care  The goals of canker sore treatment are to decrease the pain, speed healing, and prevent sores from coming back. No single treatment works for everyone. Try a number of methods to see what works best.  General care    You may find that soft, easy-to-chew foods cause less pain. Use a straw to direct liquids away from the sore.    Use a soft-bristle toothbrush, and brush your teeth gently.    Dont eat acidic, salty, or spicy foods.    Dont eat crusty or crunchy foods such as Nepali bread or potato chips. These kinds of foods can hurt the inside of your mouth, or scrape your existing canker sores.  Medicines  You can try over-the-counter medicines that cover the sores and numb them. This protects the sores while they heal and helps reduce pain.  Homemade  rinses and solutions  You can use these solutions as mouth rinses. Spit them out after using them. You can also dab them on the sores. You can repeat these treatments as often as needed.    Rinse your mouth with saltwater.    Mix equal amounts of hydrogen peroxide and water. You can use this as a mouthwash or dab it on spots with a cotton swab. You can also add sodium bicarbonate to this to make a paste, and then dab it on spots.  Follow-up care  Follow up with your healthcare provider, or as advised.    If a culture was done, you will be notified if the treatment needs to be changed. You can call as directed for the results.  Call 911  Call 911 if any of these occur:    Trouble breathing    Inability to swallow    Extreme drowsiness or trouble waking up    Fainting or loss of consciousness    Rapid heart rate    Seizure    Stiff neck  When to seek medical advice  Call your healthcare provider right away if any of these occur:    You have a fever of 100.4 F (38 C) or higher, or as directed by your provider    You are pregnant    You just had surgery or another medical procedure, or you were just discharged from the hospital    It's too painful to eat or swallow  Date Last Reviewed: 10/1/2017    4277-2369 [employer]. [address] [place] [address], [place] [address]. All rights reserved. This information is not intended as a substitute for professional medical care. Always follow your healthcare professional's instructions.

## 2021-06-18 NOTE — PATIENT INSTRUCTIONS - HE
Patient Instructions by Romero Somers MD at 12/16/2020 11:00 AM     Author: Romero Somers MD Service: -- Author Type: Physician    Filed: 12/16/2020 11:27 AM Encounter Date: 12/16/2020 Status: Signed    : Romero Somers MD (Physician)       Patient Education     Benign Paroxysmal Positional Vertigo     Your health care provider may move your head in certain ways to treat your BPPV.     Benign paroxysmal positional vertigo (BPPV) is a problem with the inner ear. The inner ear contains the vestibular system. This system is what helps you keep your balance. BPPV causes a feeling of spinning. It is a common problem of the vestibular system.  Understanding the vestibular system  The vestibular system of the ear is made up of very tiny parts. They include the utricle, saccule, and semicircular canals. The utricle is a tiny organ that contains calcium crystals. In some people, the crystals can move into the semicircular canals. When this happens, the system no longer works as it should. This causes BPPV. Benign means it is not life threatening. Paroxysmal means it happens suddenly. Positional means that it happens when you move your head. Vertigo is a feeling of spinning.  What causes BPPV?  Causes include injury to your head or neck. Other problems with the vestibular system may cause BPPV. In many people, the cause of BPPV is not known.  Symptoms of BPPV  You many have repeated feelings of spinning (vertigo). The vertigo usually lasts less than 1 minute. Some movements, such as rolling over in bed, can bring on vertigo.  Diagnosing BPPV  Your primary healthcare provider may diagnose and treat your BPPV. Or you may see an ear, nose, and throat doctor (otolaryngologist). In some cases, you may see a nervous system doctor (neurologist).  The healthcare provider will ask about your symptoms and your medical history. He or she will examine you. You may have hearing and  balance tests. As part of the exam, your healthcare provider may have you move your head and body in certain ways. If you have BPPV, the movements can bring on vertigo. Your provider will also look for abnormal movements of your eyes. You may have other tests to check your vestibular or nervous systems.  Treatment for BPPV  Your healthcare provider may try to move the calcium crystals. This is done by having you move your head and neck in certain ways. This treatment is safe and often works well. You may also be told to do these movements at home. You may still have vertigo for a few weeks. Your healthcare provider will recheck your symptoms, usually in about a month. Special physical therapy may also be part of treatment. In rare cases, surgery may be needed for BPPV that does not go away.     When to call the healthcare provider  Call your healthcare provider right away if you have any of these:    Symptoms that do not go away with treatment    Symptoms that get worse    New symptoms   Date Last Reviewed: 5/1/2017 2000-2019 [employer]. [address] [place] [address], [place] [address]. All rights reserved. This information is not intended as a substitute for professional medical care. Always follow your healthcare professional's instructions.

## 2021-06-20 NOTE — PROGRESS NOTES
ASSESSMENT/PLAN:  Urinary tract infection  This is a 33-year-old female with urine culture consistent with urinary tract infection and appropriate treatment with Septra based on her sensitivity.  She completed 5 days of symptoms with 70% improvement.  I will give her another 5 days of Septra and have her come back next week for a urine culture for test of cure.    -     Culture, Urine; Future  -     sulfamethoxazole-trimethoprim (SEPTRA DS) 800-160 mg per tablet; Take 1 tablet by mouth 2 (two) times a day for 5 days.    Right-sided abdominal pain of 2 months duration following her  section 5 months ago  She will be following up with gynecology  Schedule an appointment to see me if she has further questions or concerns following her appointment with gynecology    SUBJECTIVE:    George Hill is a 33 y.o. female who came in today for follow-up.  The patient was treated with 5 days of Bactrim for a urinary tract infection.  She reports 70% improvement.  She no longer has urinary frequency, urinary urgency, or low urinary output.  She still has some discomfort with urinating and right-sided abdominal pain.  In fact, the patient reported 5 right-sided abdominal pain of about 2 months duration following her  section 5 months ago.  She describes a intermittent discomfort on on the right side abdomen and flank.  The pain is not associated with food or movement.  There is no associated nausea vomiting.  She denies fever or chills.  States that she has an appointment with her gynecologist.    Review of Systems (except those mentioned above)  Constitutional: Negative.   HENT: Negative.   Eyes: Negative.   Respiratory: Negative.   Cardiovascular: Negative.   Gastrointestinal: Negative.   Endocrine: Negative.   Genitourinary: Negative.   Musculoskeletal: Negative.   Skin: Negative.   Allergic/Immunologic: Negative.   Neurological: Negative.   Hematological: Negative.   Psychiatric/Behavioral: Negative.      There are no active problems to display for this patient.    No Known Allergies  Current Outpatient Prescriptions   Medication Sig Dispense Refill     ergocalciferol (VITAMIN D2) 50,000 unit capsule Take 2,000 Units by mouth every 7 days.       sulfamethoxazole-trimethoprim (SEPTRA DS) 800-160 mg per tablet Take 1 tablet by mouth 2 (two) times a day for 5 days. 10 tablet 0     No current facility-administered medications for this visit.      No past medical history on file.  Past Surgical History:   Procedure Laterality Date      SECTION, CLASSIC  2013     Social History     Social History     Marital status:      Spouse name: N/A     Number of children: N/A     Years of education: N/A     Social History Main Topics     Smoking status: Never Smoker     Smokeless tobacco: Never Used     Alcohol use No     Drug use: No     Sexual activity: Yes     Partners: Male     Other Topics Concern     None     Social History Narrative     Family History   Problem Relation Age of Onset     Colon cancer Mother      Brain cancer Father          OBJECTIVE:    Vitals:    09/10/18 0850   BP: 110/64   Patient Site: Left Arm   Patient Position: Sitting   Cuff Size: Adult Regular   Pulse: 92   Temp: 98.1  F (36.7  C)   TempSrc: Oral   Weight: 175 lb (79.4 kg)     Body mass index is 26.61 kg/(m^2).    Physical Exam:  Constitutional: Patient was oriented to person, place, and time. Patient appeared well-developed and well-nourished. No distress.   Head: Normocephalic and atraumatic.   Right Ear: External ear normal.   Left Ear: External ear normal.   Nose: Nose normal.   Cardiovascular: Normal rate, regular rhythm, normal heart sounds and intact distal pulses. No murmur heard.   Pulmonary/Chest: Effort normal and breath sounds normal. No stridor. No respiratory distress. Patient had no wheezes, no rales, exhibits no tenderness.   Abdominal: Soft. Bowel sounds were normal. Patient exhibited no distension and no mass.  There was no tenderness. There was no rebound and no guarding.  No tenderness to percussion of the costovertebral angle  Skin: Skin was warm and dry. No rash noted. Patient was not diaphoretic. No erythema. No pallor.       Results for orders placed or performed in visit on 09/05/18   Wet Prep, Vaginal   Result Value Ref Range    Yeast Result No yeast seen No yeast seen    Trichomonas No Trichomonas seen No Trichomonas seen    Clue Cells, Wet Prep No Clue cells seen No Clue cells seen   Culture, Urine   Result Value Ref Range    Culture 10,000-50,000 col/ml Citrobacter koseri (!)        Susceptibility    Citrobacter koseri - SHAI     Amoxicillin + Clavulanate <=4/2 Sensitive      Ampicillin >16 Resistant      Cefazolin 2 Sensitive      Cefepime <=1 Sensitive      Ceftriaxone <=1 Sensitive      Ciprofloxacin <=0.5 Sensitive      Gentamicin <=2 Sensitive      Levofloxacin <=1 Sensitive      Meropenem <=0.25 Sensitive      Nitrofurantoin 64 Intermediate      Tobramycin <=2 Sensitive      Trimethoprim + Sulfamethoxazole <=0.5 Sensitive    Urinalysis-UC if Indicated   Result Value Ref Range    Color, UA Yellow Colorless, Yellow, Straw, Light Yellow    Clarity, UA Clear Clear    Glucose, UA Negative Negative    Bilirubin, UA Negative Negative    Ketones, UA Negative Negative    Specific Gravity, UA 1.015 1.005 - 1.030    Blood, UA Trace (!) Negative    pH, UA 7.0 5.0 - 8.0    Protein, UA Negative Negative mg/dL    Urobilinogen, UA 0.2 E.U./dL 0.2 E.U./dL, 1.0 E.U./dL    Nitrite, UA Negative Negative    Leukocytes, UA Small (!) Negative    Bacteria, UA Few (!) None Seen hpf    RBC, UA 0-2 None Seen, 0-2 hpf    WBC, UA 5-10 (!) None Seen, 0-5 hpf    Squam Epithel, UA 5-10 (!) None Seen, 0-5 lpf

## 2021-06-20 NOTE — PROGRESS NOTES
ASSESSMENT/PLAN:    Painful urination x 3 days  33-year-old female presented with 3 days of nausea, dysuria, urinary urgency, low urinary output with urinalysis showing trace blood and leukocyte esterase.  Symptoms and physical findings are suggesting urinary tract infection.  I have given her Bactrim.  I have also sent her urine for urine culture for confirmation.  I advised fluid hydration and Tylenol as needed.  Will communicate any changes based on urine culture to the patient.  Follow-up with her primary care provider if she continues to have symptoms or no improvement in the next 2 weeks.  She verbalized understanding and agreed with the plan  -     Urinalysis-UC if Indicated  -     Culture, Urine  -     sulfamethoxazole-trimethoprim (SEPTRA DS) 800-160 mg per tablet; Take 1 tablet by mouth 2 (two) times a day for 3 days.    Vaginal itching  Normal wet prep  -     Wet Prep, Vaginal    SUBJECTIVE:    George Hill is a 33 y.o. female who came in today for 3 days of nausea, urinary urgency, little urinary output, and dysuria.  The patient has been taking Tylenol to help with the dysuria.  She has not had any vomiting, fever, abdominal pain, back pain.  She has been feeling some myalgia over the last 3 days.  Her last menstrual period was August 24, 2018.  2 months ago she was seen at her gynecologist with similar symptoms but was told to continue with fluid hydration and never was given any antibiotic.  She has also endorsed vaginal itchiness without any change in vaginal discharge or bleeding.  She has no concerns regarding sexually transmitted infections    Review of Systems (except those mentioned above)  Constitutional: Negative.   HENT: Negative.   Eyes: Negative.   Respiratory: Negative.   Cardiovascular: Negative.   Gastrointestinal: Negative.   Endocrine: Negative.   Genitourinary: Negative.   Musculoskeletal: Negative.   Skin: Negative.   Allergic/Immunologic: Negative.   Neurological: Negative.    Hematological: Negative.   Psychiatric/Behavioral: Negative.     There are no active problems to display for this patient.    No Known Allergies  Current Outpatient Prescriptions   Medication Sig Dispense Refill     acetaminophen (TYLENOL) 325 MG tablet Take 650 mg by mouth every 6 (six) hours as needed for pain.       ergocalciferol (VITAMIN D2) 50,000 unit capsule Take 2,000 Units by mouth every 7 days.       ibuprofen (ADVIL,MOTRIN) 600 MG tablet Take 1 tablet (600 mg total) by mouth every 6 (six) hours as needed for pain. 20 tablet 0     oxyCODONE-acetaminophen (PERCOCET) 5-325 mg per tablet Take 1-2 tablets by mouth every 4 (four) hours as needed. 20 tablet 0     prenatal vitamin iron-folic acid 27mg-0.8mg (PRENATAL S) 27 mg iron- 800 mcg Tab tablet Take 1 tablet by mouth daily.       sulfamethoxazole-trimethoprim (SEPTRA DS) 800-160 mg per tablet Take 1 tablet by mouth 2 (two) times a day for 3 days. 6 tablet 0     No current facility-administered medications for this visit.      No past medical history on file.  Past Surgical History:   Procedure Laterality Date      SECTION, CLASSIC  2013     Social History     Social History     Marital status:      Spouse name: N/A     Number of children: N/A     Years of education: N/A     Social History Main Topics     Smoking status: Never Smoker     Smokeless tobacco: Never Used     Alcohol use No     Drug use: No     Sexual activity: Yes     Partners: Male     Other Topics Concern     None     Social History Narrative     Family History   Problem Relation Age of Onset     Colon cancer Mother      Brain cancer Father          OBJECTIVE:    Vitals:    18 0817   BP: 110/64   Pulse: 76   Weight: 175 lb 6 oz (79.5 kg)     Body mass index is 26.67 kg/(m^2).    Physical Exam:  Abdominal: Soft. Bowel sounds were normal. Patient exhibited no distension and no mass. There was no tenderness. There was no rebound and no guarding.  No costovertebral angle  tenderness to percussion  Normal pelvic exam        Results for orders placed or performed in visit on 09/05/18   Wet Prep, Vaginal   Result Value Ref Range    Yeast Result No yeast seen No yeast seen    Trichomonas No Trichomonas seen No Trichomonas seen    Clue Cells, Wet Prep No Clue cells seen No Clue cells seen   Urinalysis-UC if Indicated   Result Value Ref Range    Color, UA Yellow Colorless, Yellow, Straw, Light Yellow    Clarity, UA Clear Clear    Glucose, UA Negative Negative    Bilirubin, UA Negative Negative    Ketones, UA Negative Negative    Specific Gravity, UA 1.015 1.005 - 1.030    Blood, UA Trace (!) Negative    pH, UA 7.0 5.0 - 8.0    Protein, UA Negative Negative mg/dL    Urobilinogen, UA 0.2 E.U./dL 0.2 E.U./dL, 1.0 E.U./dL    Nitrite, UA Negative Negative    Leukocytes, UA Small (!) Negative    Bacteria, UA Few (!) None Seen hpf    RBC, UA 0-2 None Seen, 0-2 hpf    WBC, UA 5-10 (!) None Seen, 0-5 hpf    Squam Epithel, UA 5-10 (!) None Seen, 0-5 lpf

## 2021-06-20 NOTE — PROGRESS NOTES
ASSESSMENT/PLAN:  Rectal bleeding  Suspect related to bleeding external hemorrhoid secondary to constipation  I advised treatment and prevention of constipation.  Will obtain a colonoscopy due to her mom's history of colon cancer  Further management will depend on the results and her symptoms.  The patient verbalized understanding and agreed with the plan  -     Ambulatory referral for Colonoscopy    SUBJECTIVE:    George Hill is a 33 y.o. female who came in today for on and off hemorrhoids and bleeding per rectum.  She has no active symptoms today.  The symptoms have been noted for about a year now.  It started during pregnancy is gotten worse over the last 6 months.  She has been taking over-the-counter hemorrhoid treatment at the instruction of her gynecologist.  The over-the-counter treatment has not been significantly helpful.  Typically she has a daily bowel movement in the morning.  At times she does get constipated and is during this time that she noticed prolapse and bleeding.    Degree of discomfort:  mild  Bleeding:  Intermittent and associated with prolapse and constipation  Prolapse:  Only when constipated  Fecal soiling:  none  Itchiness:  mild  Weight loss: none  Abdominal pain: none  Fever:  none  Signs of anemia:  none  H/o polyp:  none  FMH of colon CA or inflammatory bowel disease:  Mom with colon cancer at age 50    Review of Systems (except those mentioned above)  Constitutional: Negative.   HENT: Negative.   Eyes: Negative.   Respiratory: Negative.   Cardiovascular: Negative.   Gastrointestinal: Negative.   Endocrine: Negative.   Genitourinary: Negative.   Musculoskeletal: Negative.   Skin: Negative.   Allergic/Immunologic: Negative.   Neurological: Negative.   Hematological: Negative.   Psychiatric/Behavioral: Negative.     There are no active problems to display for this patient.    No Known Allergies  Current Outpatient Prescriptions   Medication Sig Dispense Refill     ergocalciferol  (VITAMIN D2) 50,000 unit capsule Take 2,000 Units by mouth every 7 days.       No current facility-administered medications for this visit.      No past medical history on file.  Past Surgical History:   Procedure Laterality Date      SECTION, CLASSIC  2013     Social History     Social History     Marital status:      Spouse name: N/A     Number of children: N/A     Years of education: N/A     Social History Main Topics     Smoking status: Never Smoker     Smokeless tobacco: Never Used     Alcohol use No     Drug use: No     Sexual activity: Yes     Partners: Male     Other Topics Concern     None     Social History Narrative     Family History   Problem Relation Age of Onset     Colon cancer Mother      Brain cancer Father          OBJECTIVE:    Vitals:    10/04/18 0915   BP: 102/60   Patient Site: Left Arm   Patient Position: Sitting   Cuff Size: Adult Regular   Pulse: 80   Weight: 173 lb (78.5 kg)     Body mass index is 26.3 kg/(m^2).    Physical Exam:  Constitutional: Patient is oriented to person, place, and time. Patient appears well-developed and well-nourished. No distress.   Head: Normocephalic and atraumatic.   Right Ear: External ear normal.   Left Ear: External ear normal.   Nose: Nose normal.   Eyes: Conjunctivae and EOM are normal. Right eye exhibits no discharge. Left eye exhibits no discharge. No scleral icterus.   Skin: No rash noted. Patient is not diaphoretic. No erythema. No pallor.  Rectal exam: negative without mass, lesions or tenderness.  Good anal sphincter tone.  Stool on examining finger.  No blood on examining finger.  Anoscopy was painful but unremarkable

## 2021-06-21 NOTE — PROGRESS NOTES
"ASSESSMENT/PLAN:      Flank pain, right and RUQ abdominal pain  33-year-old female presents with 1-1-1/2 months of intermittent pain of the right flank and right upper quadrant area.  We spoke about differential diagnoses.  Gallbladder disease, liver disease, gastritis, musculoskeletal condition, and nephrolithiasis our differential diagnosis.  The management will depend on her lab results.  She may continue with over-the-counter analgesics for pain.  -     CT Abdomen Pelvis Without Oral With Without IV Contrast; Future  -     US Abdomen Complete; Future    Rectal bleeding  Recent colonoscopy showed a hyperplastic polyp at the rectum.  Due to patient's maternal history of colon cancer, I recommend colonoscopy every 5 years      SUBJECTIVE:    George Hill is a 33 y.o. female who came in today complaining of right upper quadrant abdominal pain for the past month that is radiating to her umbilicus and sometimes the right flank. The pain is intermittent and there are no triggering or alleviating factors. Patient describes the severity of the pain as 6/10. The duration of the pain varies from minutes to hours to days. She denies any issues with urination. The area does not hurt when she palpates it. She denies reflux. Her pain occurs \"almost every day.\"      Review of Systems (except those mentioned above)  Constitutional: Negative.   HENT: Negative.   Eyes: Negative.   Respiratory: Negative.   Cardiovascular: Negative.   Gastrointestinal: Negative.   Endocrine: Negative.   Genitourinary: Negative.   Musculoskeletal: Negative.   Skin: Negative.   Allergic/Immunologic: Negative.   Neurological: Negative.   Hematological: Negative.   Psychiatric/Behavioral: Negative.     There are no active problems to display for this patient.    No Known Allergies  Current Outpatient Medications   Medication Sig Dispense Refill     ergocalciferol (VITAMIN D2) 50,000 unit capsule Take 2,000 Units by mouth every 7 days.       No " current facility-administered medications for this visit.      No past medical history on file.  Past Surgical History:   Procedure Laterality Date      SECTION, CLASSIC  2013     Social History     Socioeconomic History     Marital status:      Spouse name: None     Number of children: None     Years of education: None     Highest education level: None   Social Needs     Financial resource strain: None     Food insecurity - worry: None     Food insecurity - inability: None     Transportation needs - medical: None     Transportation needs - non-medical: None   Occupational History     None   Tobacco Use     Smoking status: Never Smoker     Smokeless tobacco: Never Used   Substance and Sexual Activity     Alcohol use: No     Drug use: No     Sexual activity: Yes     Partners: Male   Other Topics Concern     None   Social History Narrative     None     Family History   Problem Relation Age of Onset     Colon cancer Mother      Brain cancer Father        OBJECTIVE:    Vitals:    18 0932   BP: 96/70   Patient Site: Left Arm   Patient Position: Sitting   Cuff Size: Adult Regular   Pulse: 74   Resp: 17   Temp: 98.8  F (37.1  C)   TempSrc: Oral   Weight: 170 lb 12.8 oz (77.5 kg)     Body mass index is 25.97 kg/m .    Physical Exam:  Constitutional: Patient was oriented to person, place, and time. Patient appeared well-developed and well-nourished. No distress.   Head: Normocephalic and atraumatic.   Right Ear: External ear normal. Normal TM  Left Ear: External ear normal. Normal TM  Nose: Nose normal.   Mouth/Throat: Oropharynx was clear and moist. No oropharyngeal exudate.   Eyes: Conjunctivae and EOM were normal. Pupils were equal, round, and reactive to light. Right eye exhibited no discharge. Left eye exhibited no discharge. No scleral icterus.   Neck: Neck supple. No JVD present. No tracheal deviation present. No thyromegaly present.   Lymphadenopathy:  Patient has no cervical adenopathy.    Cardiovascular: Normal rate, regular rhythm, normal heart sounds and intact distal pulses. No murmur heard.   Pulmonary/Chest: Effort normal and breath sounds normal. No stridor. No respiratory distress. Patient had no wheezes, no rales, exhibits no tenderness.   Abdominal: Soft. Bowel sounds were normal. Patient exhibited no distension and no mass. There was no tenderness. There was no rebound and no guarding.    Neurological: Patient was alert and oriented to person, place, and time. Patient had normal reflexes. No cranial nerve deficit. Coordination normal.   Skin: Skin was warm and dry. No rash noted. Patient was not diaphoretic. No erythema. No pallor.       Results for orders placed or performed in visit on 09/19/18   Culture, Urine   Result Value Ref Range    Culture No Growth      ADDITIONAL HISTORY SUMMARIZED (2): colonoscopy results were reviewed and summarized.   DECISION TO OBTAIN EXTRA INFORMATION (1): None  RADIOLOGY TESTS (1): US and CT scan ordered today.  LABS (1): lab results.  MEDICINE TESTS (1): None.  INDEPENDENT REVIEW (2 each): None.     Total data points = 4    Total time was 13 minutes, greater than 50% counseling and coordinating care regarding the above issues.    By signing my name below, I, Georgia Bedoya, attest that this documentation has been prepared under the direction and in the presence of Dr. Tanika Ibarra.  Electronic Signature: Soraya Jack. 11/20/2018 9:48.    Dr. MONICA .[email or web address] , personally performed the services described in this documentation. All medical record entries made by the scribe were at my direction and in my presence. I have reviewed the chart and discharge instructions (if applicable) and agree that the record reflects my personal performance and is accurate and complete.

## 2021-06-23 NOTE — PROGRESS NOTES
ASSESSMENT/PLAN:  Right ear pain  2wks of intermittent right ear and postauricular pain. Exam was unremarkable.  Recommend OTC analgesics, heat/ice, and time.  F/u if with further concerns    SUBJECTIVE:    George Hill is a 33 y.o. female who came in today complaining of intermittent pain of the R ear that has now spread behind her ear. Patient had a cold (congestion, rhinorrhea) 15 days ago. Her ear pain started while she had the cold. Denies current cough, runny nose, fever, or L ear pain.     Review of Systems (except those mentioned above)  Constitutional: Negative.   HENT: Negative.   Eyes: Negative.   Respiratory: Negative.   Cardiovascular: Negative.   Gastrointestinal: Negative.   Endocrine: Negative.   Genitourinary: Negative.   Musculoskeletal: Negative.   Skin: Negative.   Allergic/Immunologic: Negative.   Neurological: Negative.   Hematological: Negative.   Psychiatric/Behavioral: Negative.     There are no active problems to display for this patient.    No Known Allergies  Current Outpatient Medications   Medication Sig Dispense Refill     cholecalciferol, vitamin D3, 2,000 unit Tab Take 2,000 Units by mouth.       No current facility-administered medications for this visit.      No past medical history on file.  Past Surgical History:   Procedure Laterality Date      SECTION, CLASSIC  2013     Social History     Socioeconomic History     Marital status:      Spouse name: None     Number of children: None     Years of education: None     Highest education level: None   Social Needs     Financial resource strain: None     Food insecurity - worry: None     Food insecurity - inability: None     Transportation needs - medical: None     Transportation needs - non-medical: None   Occupational History     None   Tobacco Use     Smoking status: Never Smoker     Smokeless tobacco: Never Used   Substance and Sexual Activity     Alcohol use: No     Drug use: No     Sexual activity: Yes      Partners: Male   Other Topics Concern     None   Social History Narrative     None     Family History   Problem Relation Age of Onset     Colon cancer Mother      Brain cancer Father      OBJECTIVE:    Vitals:    02/05/19 1601   BP: 114/58   Patient Site: Left Arm   Patient Position: Sitting   Cuff Size: Adult Regular   Pulse: 76   Temp: 98.1  F (36.7  C)   TempSrc: Oral   Weight: 171 lb 1.6 oz (77.6 kg)     Body mass index is 26.02 kg/m .    Physical Exam:  Constitutional: Patient was oriented to person, place, and time. Patient appeared well-developed and well-nourished. No distress.   Head: Normocephalic and atraumatic.   Right Ear: External ear normal. Normal TM. No TMJ. No tenderness to palpation of the postauricular area. No mastoiditis.   Left Ear: External ear normal. Normal TM  Nose: Nose normal.   Mouth/Throat: Oropharynx was clear and moist. No oropharyngeal exudate.   Eyes: Conjunctivae and EOM were normal. Right eye exhibited no discharge. Left eye exhibited no discharge. No scleral icterus.   Neck: Neck supple. No JVD present. No tracheal deviation present. No thyromegaly present. Full ROM.   Lymphadenopathy:  Patient has no cervical adenopathy.   Neurological: Patient was alert and oriented to person, place, and time. No cranial nerve deficit. Coordination normal.   Skin: Skin was warm and dry. No rash noted. Patient was not diaphoretic. No erythema. No pallor.     Results for orders placed or performed in visit on 09/19/18   Culture, Urine   Result Value Ref Range    Culture No Growth      ADDITIONAL HISTORY SUMMARIZED (2): None.   DECISION TO OBTAIN EXTRA INFORMATION (1): None.   RADIOLOGY TESTS (1): None.  LABS (1): None.  MEDICINE TESTS (1): None.  INDEPENDENT REVIEW (2 each): None.     Total data points = 0    I spent a total time of 8 minutes additional to the preventive, greater than 50% counseling and coordinating care regarding R ear pain.    By signing my name below, Georgia ANDERSON attest  that this documentation has been prepared under the direction and in the presence of Dr. Tanika Ibarra.  Electronic Signature: Soraya Jack. 02/05/2019 16:08.    I, Dr. Romeor Edmonds MD , personally performed the services described in this documentation. All medical record entries made by the scribe were at my direction and in my presence. I have reviewed the chart and discharge instructions (if applicable) and agree that the record reflects my personal performance and is accurate and complete.

## 2021-06-26 ENCOUNTER — HEALTH MAINTENANCE LETTER (OUTPATIENT)
Age: 36
End: 2021-06-26

## 2021-06-26 NOTE — PROGRESS NOTES
Assessment & Plan     Dysuria  - Urinalysis-UC if Indicated  - Culture, Urine  - nitrofurantoin, macrocrystal-monohydrate, (MACROBID) 100 MG capsule  Dispense: 6 capsule; Refill: 0  The urinalysis looks clean but with the history , she does seem to be in pain with urination. Will treat her empirically with Nitrofurantoin. Will culture the urine and may change antibiotics based on that. :[id number]     No follow-ups on file.    Susan Li MD  [facility]    Subjective   George Hill is 36 y.o. and presents today for the following health issues   Dysuria   This is a new problem. The current episode started in the past 7 days (started 2 days ago.). The problem occurs every urination. The problem has been gradually worsening. The quality of the pain is described as burning (burning minly at the end of urination.). There has been no fever. She is sexually active. There is no history of pyelonephritis. Associated symptoms include frequency. Pertinent negatives include no chills, discharge, flank pain, sweats or urgency.        No past medical history on file.  Past Surgical History:   Procedure Laterality Date      SECTION, CLASSIC  2013     Social History     Socioeconomic History     Marital status:      Spouse name: None     Number of children: None     Years of education: None     Highest education level: None   Occupational History     None   Social Needs     Financial resource strain: None     Food insecurity     Worry: None     Inability: None     Transportation needs     Medical: None     Non-medical: None   Tobacco Use     Smoking status: Never Smoker     Smokeless tobacco: Never Used   Substance and Sexual Activity     Alcohol use: No     Drug use: No     Sexual activity: Yes     Partners: Male   Lifestyle     Physical activity     Days per week: None     Minutes per session: None     Stress: None   Relationships     Social connections     Talks  on phone: None     Gets together: None     Attends Anab[employer]ism service: None     Active member of club or organization: None     Attends meetings of clubs or organizations: None     Relationship status: None     Intimate partner violence     Fear of current or ex partner: None     Emotionally abused: None     Physically abused: None     Forced sexual activity: None   Other Topics Concern     None   Social History Narrative     None     Family History   Problem Relation Age of Onset     Colon cancer Mother      Brain cancer Father      No Known Allergies  Current Outpatient Medications   Medication Sig Dispense Refill     cholecalciferol, vitamin D3, 2,000 unit Tab Take 2,000 Units by mouth.       meclizine (ANTIVERT) 25 mg tablet Take 1 tablet (25 mg total) by mouth 3 (three) times a day as needed for dizziness or nausea. 30 tablet 1     mecobalamin (B12 ACTIVE ORAL) Take by mouth. Taking 100 mcg       nitrofurantoin, macrocrystal-monohydrate, (MACROBID) 100 MG capsule Take 1 capsule (100 mg total) by mouth 2 (two) times a day for 3 days. 6 capsule 0     No current facility-administered medications for this visit.          Review of Systems   Constitutional: Negative for chills, fatigue and fever.   Cardiovascular: Negative for chest pain.   Gastrointestinal: Negative.    Genitourinary: Positive for dysuria and frequency. Negative for flank pain, urgency, vaginal bleeding and vaginal discharge.   Musculoskeletal: Negative for back pain.         Objective    /70   Pulse 84   Temp 98  F (36.7  C)   Wt 164 lb (74.4 kg)   LMP 05/15/2021 (Approximate)   SpO2 100%   BMI 24.75 kg/m    Body mass index is 24.75 kg/m .  Physical Exam   Constitutional: She appears well-developed and well-nourished. No distress.   Cardiovascular: Intact distal pulses.   Pulmonary/Chest: Effort normal.   Neurological: She is alert.   Psychiatric: She has a normal mood and affect.

## 2021-06-27 NOTE — PROGRESS NOTES
Progress Notes by Mikhail Girard PA-C at 2019  7:20 AM     Author: Mikhail Girard PA-C Service: -- Author Type: Physician Assistant    Filed: 2019  8:16 AM Encounter Date: 2019 Status: Signed    : Mikhail Girard PA-C (Physician Assistant)       Subjective:      Patient ID: George Hill is a 34 y.o. female.    Chief Complaint:    HPI  George Hill is a 34 y.o. female who presents today complaining of five day acute onset of sore throat and odynophagiaa and dry non-productive cough.  Patient denies fever, chills, night sweats, fatigue, vomiting, diarrhea, skin rash, abdominal pain or urinary symptoms.      No known sick contacts for strep throat.    Has not tried treatment for this over-the-counter.    No past medical history on file.    Past Surgical History:   Procedure Laterality Date   ?  SECTION, CLASSIC         Family History   Problem Relation Age of Onset   ? Colon cancer Mother    ? Brain cancer Father        Social History     Tobacco Use   ? Smoking status: Never Smoker   ? Smokeless tobacco: Never Used   Substance Use Topics   ? Alcohol use: No   ? Drug use: No       Review of Systems  As above in HPI, otherwise balance of Review of Systems are negative.    Objective:     /70 (Patient Site: Right Arm, Patient Position: Sitting)   Pulse 82   Temp 98.4  F (36.9  C)   Resp 17   Wt 159 lb (72.1 kg)   LMP 2019   SpO2 100%   Breastfeeding? No   BMI 24.18 kg/m      Physical Exam  General: Patient is resting comfortably no acute distress is afebrile  HEENT: Head is normocephalic atraumatic   eyes are PERRL EOMI sclera anicteric   TMs are clear bilaterally  Throat is erythematous and with some white exudate on the left tonsil   No cervical lymphadenopathy present  LUNGS: Clear to auscultation bilaterally  HEART: Regular rate and rhythm  Skin: Without rash non-diaphoretic    Lab:  Recent Results (from the past 24 hour(s))   Rapid Strep A Screen-Throat    Result Value Ref Range    Rapid Strep A Antigen Group A Strep detected (!) No Group A Strep detected, presumptive negative       Assessment:     Procedures    The primary encounter diagnosis was Strep throat. A diagnosis of Throat pain was also pertinent to this visit.    Plan:     1. Strep throat  penicillin VK (PEN VK) 500 MG tablet   2. Throat pain  Rapid Strep A Screen-Throat         Patient Instructions     Suggested increased rest increased fluids and bedside humidification  Over-the-counter Tylenol for comfort.  Follow packaging directions  Over-the-counter throat lozenges with benzocaine such as Cepacol may be used if indicated and is not a choking hazard based on age.  Follow packaging directions.  Do not overuse the benzocaine as it will dry the throat and make it uncomfortable.  Noncontagious after 24 hours on the antibiotic.  Change toothbrush out after 48 hours to avoid reinfecting the mouth.  Follow up with primary care provider if you do not get resolution with the course of treatment.  Return to walk-in care if complication or new symptoms arise in the interim.        Self-Care for Sore Throats  Sore throats happen for many reasons, such as colds, allergies, and infections caused by viruses or bacteria. In any case, your throat becomes red and sore. Your goal for self-care is to reduce your discomfort while giving your throat a chance to heal.    Moisten and soothe your throat  Tips include the following:    Try a sip of water first thing after waking up.    Keep your throat moist by drinking 6 or more glasses of clear liquids every day.    Run a cool-air humidifier in your room overnight.    Avoid cigarette smoke.     Suck on throat lozenges, cough drops, hard candy, ice chips, or frozen fruit-juice bars. Use the sugar-free versions if your diet or medical condition requires them.  Gargle to ease irritation  Gargling every hour or 2 can ease irritation. Try gargling with 1 of these  solutions:    1/4 teaspoon of salt in 1/2 cup of warm water    An over-the-counter anesthetic gargle  Use medicine for more relief  Over-the-counter medicine can reduce sore throat symptoms. Ask your pharmacist if you have questions about which medicine to use:    Ease pain with anesthetic sprays. Aspirin or an aspirin substitute also helps. Remember, never give aspirin to anyone 18 or younger, or if you are already taking blood thinners.     For sore throats caused by allergies, try antihistamines to block the allergic reaction.    Remember: unless a sore throat is caused by a bacterial infection, antibiotics wont help you.  Prevent future sore throats  Prevention tips include the following:    Stop smoking or reduce contact with secondhand smoke. Smoke irritates the tender throat lining.    Limit contact with pets and with allergy-causing substances, such as pollen and mold.    When youre around someone with a sore throat or cold, wash your hands often to keep viruses or bacteria from spreading.    Dont strain your vocal cords.  Call your healthcare provider  Contact your healthcare provider if you have:    A temperature over 101 F (38.3 C)    White spots on the throat    Great difficulty swallowing    Trouble breathing    A skin rash    Recent exposure to someone else with strep bacteria    Severe hoarseness and swollen glands in the neck or jaw   Date Last Reviewed: 8/1/2016 2000-2016 [employer]. [address] [place] [address], [place] [address]. All rights reserved. This information is not intended as a substitute for professional medical care. Always follow your healthcare professional's instructions.

## 2021-06-29 NOTE — PROGRESS NOTES
Progress Notes by Romero Somers MD at 2020  9:00 AM     Author: Romero Somers MD Service: -- Author Type: Physician    Filed: 2020  9:40 AM Encounter Date: 2020 Status: Signed    : Romero Somers MD (Physician)       FEMALE PREVENTATIVE EXAM    Assessment and Plan:       George was seen today for annual exam.    Routine general medical examination at a health care facility  -     Lipid Cascade  -     Glucose  -     Gynecologic Cytology (PAP Smear)  We discussed healthy lifestyle, nutrition, cardiovascular risk reduction, self care, safety, sunscreen, seatbelt, and timing of cancer screening.  Health maintenance screening and immunizations reviewed with the patient.  Mother  of colon cancer at age 56yo.  Patient had colonoscopy , recommend every 5 years    Next follow up:  yearly    Immunization Review  Adult Imm Review: No immunizations due today    I discussed the following with the patient:   Adult Healthy Living: Importance of regular exercise  Healthy nutrition      Subjective:   Chief Complaint: George Hill is an 35 y.o. female here for a preventative health visit.     HPI:  No new concerns.  She wanted to let me know about her h/o recurrent apthous ulcers.  Has overall been better    Healthy Habits  Are you taking a daily aspirin? No  Do you typically exercising at least 40 min, 3-4 times per week?  NO  Do you usually eat at least 4 servings of fruit and vegetables a day, include whole grains and fiber and avoid regularly eating high fat foods? Yes  Have you had an eye exam in the past two years? Yes  Do you see a dentist twice per year? Yes  Do you have any concerns regarding sleep? No    Safety Screen  If you own firearms, are they secured in a locked gun cabinet or with trigger locks? The patient declines to answer  Do you feel you are safe where you are living?: Yes (2020  2:46 PM)  Do you feel you are safe in your  "relationship(s)?: Yes (5/2/2020  2:46 PM)      Review of Systems:  Please see above.  The rest of the review of systems are negative for all systems.     Pap History:   Yes - updated in Problem List and Health Maintenance accordingly  Cancer Screening       Status Date      PAP SMEAR Overdue 3/30/2006           Patient Care Team:  Romero Somers MD as PCP - General (Family Medicine)  Romero Somers MD as Assigned PCP        History     Reviewed By Date/Time Sections Reviewed    Chiqui Tariq CMA 8/20/2020  9:04 AM Tobacco            Objective:   Vital Signs:   Visit Vitals  /74 (Patient Site: Left Arm, Patient Position: Sitting, Cuff Size: Adult Regular)   Pulse 88   Ht 5' 8.25\" (1.734 m)   Wt 162 lb 11.2 oz (73.8 kg)   SpO2 100%   BMI 24.56 kg/m           PHYSICAL EXAM    Objective:    Physical Exam   Vitals:    08/20/20 0902   BP: 108/74   Pulse: 88   SpO2: 100%      Constitutional: Patient is oriented to person, place, and time. Patient appears well-developed and well-nourished. No distress.   Head: Normocephalic and atraumatic.   Right Ear: External ear normal. Normal TM  Left Ear: External ear normal. Normal TM  Nose: Nose normal.   Mouth/Throat: Oropharynx is clear and moist. No oropharyngeal exudate.   Eyes: Conjunctivae and EOM are normal. Pupils are equal, round, and reactive to light. Right eye exhibits no discharge. Left eye exhibits no discharge. No scleral icterus.   Neck: Neck supple. No JVD present. No tracheal deviation present. No thyromegaly present.   Cardiovascular: Normal rate, regular rhythm, normal heart sounds and intact distal pulses. No murmur heard.   Pulmonary/Chest: Effort normal and breath sounds normal. No stridor. No respiratory distress. Patient has no wheezes, no rales, exhibits no tenderness.   Abdominal: Soft. Bowel sounds are normal. Patient exhibits no distension and no mass. There is no tenderness. There is no rebound and no guarding. "   Lymphadenopathy:  Patient has no cervical adenopathy.   Neurological: Patient is alert and oriented to person, place, and time. Patient has normal reflexes. No cranial nerve deficit. Coordination normal.   Skin: Skin is warm and dry. No rash noted. Patient is not diaphoretic. No erythema. No pallor.   Normal breast exam  Normal pelvic exam           Medication List          Accurate as of August 20, 2020  9:39 AM. If you have any questions, ask your nurse or doctor.            CONTINUE taking these medications    acetaminophen 325 MG tablet  Also known as:  TYLENOL  INSTRUCTIONS:  Take 650 mg by mouth every 6 (six) hours as needed for pain.        cholecalciferol (vitamin D3) 50 mcg (2,000 unit) Tab  INSTRUCTIONS:  Take 2,000 Units by mouth.        multivitamin therapeutic tablet  INSTRUCTIONS:  Take 1 tablet by mouth daily.               Additional Screenings Completed Today:

## 2021-06-29 NOTE — PROGRESS NOTES
"Progress Notes by Mikhail Girard PA-C at 2020  2:30 PM     Author: Mikhail Girard PA-C Service: -- Author Type: Physician Assistant    Filed: 2020  8:12 AM Encounter Date: 2020 Status: Signed    : Mikhail Girard PA-C (Physician Assistant)       Subjective:      Patient ID: George Hill is a 35 y.o. female.    Chief Complaint:    HPI  George Hill is a 35 y.o. female who presents today complaining of concern for possible \"thrush\" over the last 2 weeks.  Patient states that she is noticed some white blisters in the mouth that have been painful.  There is also been some other areas around the mouth with ulcers that have been moving around on the patient.  She was concerned that these are not going away and that they have occurred on different areas.  At this time she does not have any on the soft palate or on the posterior aspect of the throat or any difficulty with swallowing either liquids foods or having pain in the throat.  She has not had fever and no overt cervical lymphadenopathy to report.  No noted skin rash.    History reviewed. No pertinent past medical history.    Past Surgical History:   Procedure Laterality Date   ?  SECTION, CLASSIC  2013       Family History   Problem Relation Age of Onset   ? Colon cancer Mother    ? Brain cancer Father        Social History     Tobacco Use   ? Smoking status: Never Smoker   ? Smokeless tobacco: Never Used   Substance Use Topics   ? Alcohol use: No   ? Drug use: No       Review of Systems  As above in HPI, otherwise balance of Review of Systems are negative.    Objective:     /78 (Patient Site: Left Arm, Patient Position: Sitting, Cuff Size: Adult Regular)   Pulse 98   Temp 98.7  F (37.1  C) (Oral)   Resp 16   Wt 164 lb (74.4 kg)   LMP 2020 (Exact Date)   SpO2 100%   BMI 24.94 kg/m      Physical Exam  General: Patient is resting comfortably no acute distress is afebrile  HEENT: Head is normocephalic atraumatic   eyes " are PERRL EOMI sclera anicteric   TMs are clear bilaterally  Throat is clear, no noted exudate.  There are a few aphthous ulcers noted on the left lower gumline and the right right lower gumline and none noted on the buccal mucosa; no involvement of the soft palate or the posterior pharyngeal wall.  No cervical lymphadenopathy present  LUNGS: Clear to auscultation bilaterally  HEART: Regular rate and rhythm  Skin: Without rash non-diaphoretic    Assessment:     Procedures    The encounter diagnosis was Stomatitis and mucositis.    Plan:     1. Stomatitis and mucositis  triamcinolone (KENALOG) 0.1 % paste         Patient Instructions               Patient Education     Stomatitis (Child)  Stomatitis is pain inside the mouth. It can involve open sores (canker sores) or redness and swelling. It occurs on the inside of the cheeks or on the tongue or gums. Stomatitis is more common in children, but it can occur at any age.  Causes  There are many causes of stomatitis, but the most common is viral infections. Other common causes are:    Injury or irritation of the mouth lining    Fungal or bacterial infections    Using tobacco    Irritating foods or chemicals, such as citrus fruit, toothpaste, or mouthwash    Lack of certain vitamins, including vitamins B and C    A weakened immune system  Symptoms  Stomatitis can result in a variety of symptoms, including:    Redness inside the mouth    Sores (ulcers) in the mouth    Pain or burning    Swelling    Fever  Treatment  For a viral infection, usually only the symptoms are treated. Antibiotics do not kill viruses and are not recommended for this condition. This infection should go away within 7 to 10 days.  Home care    Use a local numbing solution for pain relief. Ask the pharmacist for suggestions on which brand and strength is best for your child. You may apply this directly to the sores with a cotton swab or with your finger. Use the numbing solution just before meals if  eating is a problem.    Older children may rinse their mouth with warm saltwater (  teaspoon of salt in 1 glass of warm water). Be certain they spit the rinse out and dont swallow it.    Feed your child a soft diet, along with plenty of fluids to prevent dehydration. If your child doesn't want to eat solid foods, it's OK for a few days, as long as he or she drinks lots of fluids. Cool drinks and frozen treats are soothing. Avoid citrus juices (orange juice, lemonade, etc.) and salty or spicy foods. These may cause more pain in the mouth.    Follow the healthcare provider's instructions on the use of over-the-counter pain medicines such as acetaminophen for fever, fussiness, or pain. In infants older than 6 months, you may use children's ibuprofen. (Note: If your child has chronic liver or kidney disease or has ever had a stomach ulcer or gastrointestinal bleeding, talk with your catie healthcare provider before using these medicines.) Dont give aspirin to a child younger than age 19 unless directed by your catie provider. Taking aspirin can put your child at risk for Reye syndrome. This is a rare but very serious disorder. It most often affects the brain and the liver.    Children should stay home until their fever is gone and they are eating and drinking well.  Follow-up care  Follow up with your catie healthcare provider, or as advised.  If a culture was done, you will be notified if the treatment needs to be changed. You can call as directed for the results.  Call 911  Call 911 if any of these occur:    Trouble breathing    Inability to swallow    Extremes drowsiness or trouble waking up    Fainting or loss of consciousness    Rapid heart rate    Seizure    Stiff neck  When to seek medical advice  For a usually healthy child, call your child's healthcare provider right away if any of these occur:    Your child has a fever (see Fever and children, below).    Your child can't eat or drink due to mouth  pain.    Your child shows unusual fussiness, drowsiness, or confusion.    Your child shows symptoms of dehydration, including no wet diapers for 8 hours, no tears when crying, sunken eyes, or a dry mouth.     Fever and children  Always use a digital thermometer to check your catie temperature. Never use a mercury thermometer.  For infants and toddlers, be sure to use a rectal thermometer correctly. A rectal thermometer may accidentally poke a hole in (perforate) the rectum. It may also pass on germs from the stool. Always follow the product makers directions for proper use. If you dont feel comfortable taking a rectal temperature, use another method. When you talk to your catie healthcare provider, tell him or her which method you used to take your catie temperature.  Here are guidelines for fever temperature. Ear temperatures arent accurate before 6 months of age. Dont take an oral temperature until your child is at least 4 years old.  Infant under 3 months old:    Ask your catie healthcare provider how you should take the temperature.    Rectal or forehead (temporal artery) temperature of 100.4 F (38 C) or higher, or as directed by the provider    Armpit temperature of 99 F (37.2 C) or higher, or as directed by the provider  Child age 3 to 36 months:    Rectal, forehead, or ear temperature of 102 F (38.9 C) or higher, or as directed by the provider    Armpit (axillary) temperature of 101 F (38.3 C) or higher, or as directed by the provider  Child of any age:    Repeated temperature of 104 F (40 C) or higher, or as directed by the provider    Fever that lasts more than 24 hours in a child under 2 years old. Or a fever that lasts for 3 days in a child 2 years or older.   Date Last Reviewed: 11/1/2017 2000-2017 [employer]. [address] [place] [address], [place] [address]. All rights reserved. This information is not intended as a substitute for professional medical care. Always follow your healthcare  professional's instructions.           Patient Education     Canker Sore    A canker sore (also called an aphthous ulcer) is a painful sore on the lining of the mouth. It is most painful during the first few days, and it lasts about 7 to 14 days before going away.  Causes  Canker sores are not cold sores or fever blisters. They are not contagious, so they are not spread by contact. The exact cause of canker sores is not clear, but there are a number of things that can trigger them in different people.    Mild injury, such as biting the inside of the mouth, lip, or cheek, or dental procedures    Stress    Poor diet, or lack of certain nutrients, including B vitamins and iron    Foods that can irritate the mouth, including tomatoes, citrus fruits, and some nuts (foods that are acidic or contain bitter substances called tannins)    Irritating chemicals, such as those in some toothpastes and mouthwashes    Certain chronic illnesses  Symptoms  Canker sores are found on the lining of the mouth. They can be inside the cheeks or lips, on the roof of the mouth, at the base of the gums, on the tongue, or in the back of the throat. Canker sores typically have these characteristics:    Small, flat (not raised) sores    Can be white or yellowish bumps that are red around the edges or have a red halo    Usually small in size, roundish, and in groups    Accompanied by pain or burning  Canker sores don't leave a scar. But they usually come back.  Home care  The goals of canker sore treatment are to decrease the pain, speed healing, and prevent sores from coming back. No single treatment works for everyone. Try a number of methods to see what works best.  General care    You may find that soft, easy-to-chew foods cause less pain. Use a straw to direct liquids away from the sore.    Use a soft-bristle toothbrush, and brush your teeth gently.    Dont eat acidic, salty, or spicy foods.    Dont eat crusty or crunchy foods such as Sami  bread or potato chips. These kinds of foods can hurt the inside of your mouth, or scrape your existing canker sores.  Medicines  You can try over-the-counter medicines that cover the sores and numb them. This protects the sores while they heal and helps reduce pain.  Homemade rinses and solutions  You can use these solutions as mouth rinses. Spit them out after using them. You can also dab them on the sores. You can repeat these treatments as often as needed.    Rinse your mouth with saltwater.    Mix equal amounts of hydrogen peroxide and water. You can use this as a mouthwash or dab it on spots with a cotton swab. You can also add sodium bicarbonate to this to make a paste, and then dab it on spots.  Follow-up care  Follow up with your healthcare provider, or as advised.    If a culture was done, you will be notified if the treatment needs to be changed. You can call as directed for the results.  Call 911  Call 911 if any of these occur:    Trouble breathing    Inability to swallow    Extreme drowsiness or trouble waking up    Fainting or loss of consciousness    Rapid heart rate    Seizure    Stiff neck  When to seek medical advice  Call your healthcare provider right away if any of these occur:    You have a fever of 100.4 F (38 C) or higher, or as directed by your provider    You are pregnant    You just had surgery or another medical procedure, or you were just discharged from the hospital    It's too painful to eat or swallow  Date Last Reviewed: 10/1/2017    9081-0341 [employer]. [address] [place] [address], [place] [address]. All rights reserved. This information is not intended as a substitute for professional medical care. Always follow your healthcare professional's instructions.

## 2021-06-30 NOTE — PROGRESS NOTES
Progress Notes by Romero Somers MD at 12/16/2020 11:00 AM     Author: Romero Somers MD Service: -- Author Type: Physician    Filed: 12/16/2020 12:17 PM Encounter Date: 12/16/2020 Status: Signed    : Romero oSmers MD (Physician)       ASSESSMENT/PLAN:  George was seen today for follow-up.    Diagnoses and all orders for this visit:    Benign paroxysmal positional vertigo of right ear  Start meclizine  Gave her information about epley's maneuver exercises to do at home  -     meclizine (ANTIVERT) 25 mg tablet; Take 1 tablet (25 mg total) by mouth 3 (three) times a day as needed for dizziness or nausea.    Anxiety  Motivational interviewing was utilized today.  Modified cognitive behavioral therapy was performed with counseling on internal locus of control.  Discussed importance of self care, sleep, nutrition, hydration, exercise, and mindfulness       Patient Instructions     Patient Education     Benign Paroxysmal Positional Vertigo     Your health care provider may move your head in certain ways to treat your BPPV.     Benign paroxysmal positional vertigo (BPPV) is a problem with the inner ear. The inner ear contains the vestibular system. This system is what helps you keep your balance. BPPV causes a feeling of spinning. It is a common problem of the vestibular system.  Understanding the vestibular system  The vestibular system of the ear is made up of very tiny parts. They include the utricle, saccule, and semicircular canals. The utricle is a tiny organ that contains calcium crystals. In some people, the crystals can move into the semicircular canals. When this happens, the system no longer works as it should. This causes BPPV. Benign means it is not life threatening. Paroxysmal means it happens suddenly. Positional means that it happens when you move your head. Vertigo is a feeling of spinning.  What causes BPPV?  Causes include injury to your head or  neck. Other problems with the vestibular system may cause BPPV. In many people, the cause of BPPV is not known.  Symptoms of BPPV  You many have repeated feelings of spinning (vertigo). The vertigo usually lasts less than 1 minute. Some movements, such as rolling over in bed, can bring on vertigo.  Diagnosing BPPV  Your primary healthcare provider may diagnose and treat your BPPV. Or you may see an ear, nose, and throat doctor (otolaryngologist). In some cases, you may see a nervous system doctor (neurologist).  The healthcare provider will ask about your symptoms and your medical history. He or she will examine you. You may have hearing and balance tests. As part of the exam, your healthcare provider may have you move your head and body in certain ways. If you have BPPV, the movements can bring on vertigo. Your provider will also look for abnormal movements of your eyes. You may have other tests to check your vestibular or nervous systems.  Treatment for BPPV  Your healthcare provider may try to move the calcium crystals. This is done by having you move your head and neck in certain ways. This treatment is safe and often works well. You may also be told to do these movements at home. You may still have vertigo for a few weeks. Your healthcare provider will recheck your symptoms, usually in about a month. Special physical therapy may also be part of treatment. In rare cases, surgery may be needed for BPPV that does not go away.     When to call the healthcare provider  Call your healthcare provider right away if you have any of these:    Symptoms that do not go away with treatment    Symptoms that get worse    New symptoms   Date Last Reviewed: 5/1/2017 2000-2019 [employer]. [address] [place] [address], [place] [address]. All rights reserved. This information is not intended as a substitute for professional medical care. Always follow your healthcare professional's instructions.        "        SUBJECTIVE:    George Hill is a 35 y.o. female who came in today     Vertigo x 1wk \"rollercoaster ride\"  Worse with lying down and getting up from supine position  Constant   Nights are worse   No nausea  For the last 6 days has been sleeping on recliner  Right ear discomfort & clogged feeling    No fever  Taking 2000iu vitamin D  1000mcg B12  Hed her iron supplement because of nausea      Review of Systems (except those mentioned above)  Constitutional: Negative.   HENT: Negative.   Eyes: Negative.   Respiratory: Negative.   Cardiovascular: Negative.   Gastrointestinal: Negative.   Endocrine: Negative.   Genitourinary: Negative.   Musculoskeletal: Negative.   Skin: Negative.   Allergic/Immunologic: Negative.   Neurological: Negative.   Hematological: Negative.   Psychiatric/Behavioral: Negative.     There are no active problems to display for this patient.    No Known Allergies  Current Outpatient Medications   Medication Sig Dispense Refill   ? cholecalciferol, vitamin D3, 2,000 unit Tab Take 2,000 Units by mouth.     ? mecobalamin (B12 ACTIVE ORAL) Take by mouth. Taking 100 mcg     ? meclizine (ANTIVERT) 25 mg tablet Take 1 tablet (25 mg total) by mouth 3 (three) times a day as needed for dizziness or nausea. 30 tablet 1     No current facility-administered medications for this visit.      No past medical history on file.  Past Surgical History:   Procedure Laterality Date   ?  SECTION, CLASSIC  2013     Social History     Socioeconomic History   ? Marital status:      Spouse name: None   ? Number of children: None   ? Years of education: None   ? Highest education level: None   Occupational History   ? None   Social Needs   ? Financial resource strain: None   ? Food insecurity     Worry: None     Inability: None   ? Transportation needs     Medical: None     Non-medical: None   Tobacco Use   ? Smoking status: Never Smoker   ? Smokeless tobacco: Never Used   Substance and Sexual " Activity   ? Alcohol use: No   ? Drug use: No   ? Sexual activity: Yes     Partners: Male   Lifestyle   ? Physical activity     Days per week: None     Minutes per session: None   ? Stress: None   Relationships   ? Social connections     Talks on phone: None     Gets together: None     Attends Latter day service: None     Active member of club or organization: None     Attends meetings of clubs or organizations: None     Relationship status: None   ? Intimate partner violence     Fear of current or ex partner: None     Emotionally abused: None     Physically abused: None     Forced sexual activity: None   Other Topics Concern   ? None   Social History Narrative   ? None     Family History   Problem Relation Age of Onset   ? Colon cancer Mother    ? Brain cancer Father          OBJECTIVE:    Vitals:    12/16/20 1113   BP: 108/74   Patient Site: Left Arm   Patient Position: Sitting   Cuff Size: Adult Regular   Pulse: 84   Temp: 98.2  F (36.8  C)   TempSrc: Oral   SpO2: 100%   Weight: 165 lb (74.8 kg)     Body mass index is 24.9 kg/m .    Physical Exam:  Constitutional: Patient was oriented to person, place, and time. Patient appeared well-developed and well-nourished. No distress.   Head: Normocephalic and atraumatic.   Right Ear: External ear normal. Normal TM  Left Ear: External ear normal. Normal TM  Eyes: Conjunctivae and EOM were normal. Right eye exhibited no discharge. Left eye exhibited no discharge. No scleral icterus.   Neck: Neck supple. No JVD present. No tracheal deviation present. No thyromegaly present.   Neurological: Patient was alert and oriented to person, place, and time. Patient had normal reflexes. No cranial nerve deficit. Coordination normal.   Skin: Skin was warm and dry. No rash noted. Patient was not diaphoretic. No erythema. No pallor.       Results for orders placed or performed in visit on 12/03/20   Vitamin B12   Result Value Ref Range    Vitamin B-12 1,926 (H) 213 - 816 pg/mL   Ferritin    Result Value Ref Range    Ferritin 51 10 - 130 ng/mL     A total of 25 minutes visit with over 50% of the time spent on counseling the patient, coordinating care, time precharting, completing documentation of the visit, and working on the encounter.

## 2021-07-06 VITALS
DIASTOLIC BLOOD PRESSURE: 70 MMHG | OXYGEN SATURATION: 100 % | HEART RATE: 84 BPM | BODY MASS INDEX: 24.75 KG/M2 | TEMPERATURE: 98 F | SYSTOLIC BLOOD PRESSURE: 110 MMHG | WEIGHT: 164 LBS

## 2021-09-27 ENCOUNTER — OFFICE VISIT (OUTPATIENT)
Dept: FAMILY MEDICINE | Facility: CLINIC | Age: 36
End: 2021-09-27
Payer: COMMERCIAL

## 2021-09-27 VITALS
OXYGEN SATURATION: 99 % | HEIGHT: 69 IN | DIASTOLIC BLOOD PRESSURE: 68 MMHG | WEIGHT: 160.38 LBS | SYSTOLIC BLOOD PRESSURE: 104 MMHG | BODY MASS INDEX: 23.76 KG/M2 | HEART RATE: 84 BPM

## 2021-09-27 DIAGNOSIS — Z00.00 ROUTINE ADULT HEALTH MAINTENANCE: Primary | ICD-10-CM

## 2021-09-27 DIAGNOSIS — G43.809 VESTIBULAR MIGRAINE: ICD-10-CM

## 2021-09-27 LAB
CHOLEST SERPL-MCNC: 155 MG/DL
FASTING STATUS PATIENT QL REPORTED: YES
FASTING STATUS PATIENT QL REPORTED: YES
GLUCOSE BLD-MCNC: 90 MG/DL (ref 70–125)
HDLC SERPL-MCNC: 53 MG/DL
LDLC SERPL CALC-MCNC: 88 MG/DL
MAGNESIUM SERPL-MCNC: 2 MG/DL (ref 1.8–2.6)
TRIGL SERPL-MCNC: 70 MG/DL

## 2021-09-27 PROCEDURE — 99000 SPECIMEN HANDLING OFFICE-LAB: CPT | Performed by: FAMILY MEDICINE

## 2021-09-27 PROCEDURE — 36415 COLL VENOUS BLD VENIPUNCTURE: CPT | Performed by: FAMILY MEDICINE

## 2021-09-27 PROCEDURE — 99395 PREV VISIT EST AGE 18-39: CPT | Performed by: FAMILY MEDICINE

## 2021-09-27 PROCEDURE — 83735 ASSAY OF MAGNESIUM: CPT | Performed by: FAMILY MEDICINE

## 2021-09-27 PROCEDURE — 82947 ASSAY GLUCOSE BLOOD QUANT: CPT | Performed by: FAMILY MEDICINE

## 2021-09-27 PROCEDURE — 84252 ASSAY OF VITAMIN B-2: CPT | Mod: 90 | Performed by: FAMILY MEDICINE

## 2021-09-27 PROCEDURE — 80061 LIPID PANEL: CPT | Performed by: FAMILY MEDICINE

## 2021-09-27 PROCEDURE — 99213 OFFICE O/P EST LOW 20 MIN: CPT | Mod: 25 | Performed by: FAMILY MEDICINE

## 2021-09-27 ASSESSMENT — MIFFLIN-ST. JEOR: SCORE: 1473.9

## 2021-09-27 NOTE — PROGRESS NOTES
"   SUBJECTIVE:   CC: George Hill is an 36 year old woman who presents for preventive health visit.       Patient has been advised of split billing requirements and indicates understanding: Yes  Healthy Habits:     Getting at least 3 servings of Calcium per day:  Yes    Bi-annual eye exam:  Yes    Dental care twice a year:  Yes    Sleep apnea or symptoms of sleep apnea:  None    Diet:  Regular (no restrictions)    Frequency of exercise:  2-3 days/week    Duration of exercise:  15-30 minutes    Taking medications regularly:  No    Medication side effects:  None    PHQ-2 Total Score: 0    Additional concerns today:  Yes    Ability to successfully perform activities of daily living: Yes, no assistance needed  Home safety:  none identified     Diagnosed with vestibular migraine in [place]. Still with \"slight shakiness\" without tremors, vertigo, nausea, dizziness. Has on/off fullness/pressure/pain of right ears and right frontal headache. Magnesium & riboflavin by neurologist friend.    Today's PHQ-2 Score:   PHQ-2 ( 1999 [employer]) 9/27/2021   Q1: Little interest or pleasure in doing things 0   Q2: Feeling down, depressed or hopeless 0   PHQ-2 Score 0   Q1: Little interest or pleasure in doing things Not at all   Q2: Feeling down, depressed or hopeless Not at all   PHQ-2 Score 0       Abuse: Current or Past (Physical, Sexual or Emotional) - No  Do you feel safe in your environment? Yes    Social History     Tobacco Use     Smoking status: Never Smoker     Smokeless tobacco: Never Used   Substance Use Topics     Alcohol use: No     If you drink alcohol do you typically have >3 drinks per day or >7 drinks per week? No    Alcohol Use 9/27/2021   Prescreen: >3 drinks/day or >7 drinks/week? Not Applicable   No flowsheet data found.    Reviewed orders with patient.  Reviewed health maintenance and updated orders accordingly - Yes  Lab work is in process    Breast Cancer Screening:  Any new diagnosis of family breast, ovarian, " or bowel cancer? No    FHS-7: No flowsheet data found.  click delete button to remove this line now  Patient under 40 years of age: Routine Mammogram Screening not recommended.   Pertinent mammograms are reviewed under the imaging tab.    History of abnormal Pap smear: NO - age 30-65 PAP every 5 years with negative HPV co-testing recommended  PAP / HPV Latest Ref Rng & Units 2020   PAP Negative for squamous intraepithelial lesion or malignancy. Negative for squamous intraepithelial lesion or malignancy  Electronically signed by Mayi Avelar CT (ASCP) on 2020 at 11:23 AM     HPV16 NEG Negative   HPV18 NEG Negative   HRHPV NEG Negative     Reviewed and updated as needed this visit by clinical staff  Tobacco  Allergies  Meds  Problems             Reviewed and updated as needed this visit by Provider    Meds  Problems            No past medical history on file.   Past Surgical History:   Procedure Laterality Date      SECTION       OB History    Para Term  AB Living   2 2 2 0 0 1   SAB TAB Ectopic Multiple Live Births   0 0 0 0 1      # Outcome Date GA Lbr Barney/2nd Weight Sex Delivery Anes PTL Lv   2 Term 18 38w5d  3.05 kg (6 lb 11.6 oz) F CS-LTranv Spinal  JENN      Name: ERICFEMALE-FERNANDEZ      Apgar1: 9  Apgar5: 9   1 Term                Review of Systems  CONSTITUTIONAL: NEGATIVE for fever, chills, change in weight  INTEGUMENTARU/SKIN: NEGATIVE for worrisome rashes, moles or lesions  EYES: NEGATIVE for vision changes or irritation  ENT: NEGATIVE for ear, mouth and throat problems  RESP: NEGATIVE for significant cough or SOB  BREAST: NEGATIVE for masses, tenderness or discharge  CV: NEGATIVE for chest pain, palpitations or peripheral edema  GI: NEGATIVE for nausea, abdominal pain, heartburn, or change in bowel habits  : NEGATIVE for unusual urinary or vaginal symptoms. Periods are regular.  MUSCULOSKELETAL: NEGATIVE for significant arthralgias or  "myalgia  NEURO: NEGATIVE for weakness, dizziness or paresthesias  PSYCHIATRIC: NEGATIVE for changes in mood or affect     OBJECTIVE:   /68 (BP Location: Left arm, Patient Position: Sitting, Cuff Size: Adult Regular)   Pulse 84   Ht 1.74 m (5' 8.5\")   Wt 72.7 kg (160 lb 6 oz)   LMP 09/19/2021   SpO2 99%   BMI 24.03 kg/m    Physical Exam  GENERAL: healthy, alert and no distress  EYES: Eyes grossly normal to inspection, PERRL and conjunctivae and sclerae normal  HENT: ear canals and TM's normal, nose and mouth without ulcers or lesions  NECK: no adenopathy, no asymmetry, masses, or scars and thyroid normal to palpation  RESP: lungs clear to auscultation - no rales, rhonchi or wheezes  BREAST: normal without masses, tenderness or nipple discharge and no palpable axillary masses or adenopathy  CV: regular rate and rhythm, normal S1 S2, no S3 or S4, no murmur, click or rub, no peripheral edema and peripheral pulses strong  ABDOMEN: soft, nontender, no hepatosplenomegaly, no masses and bowel sounds normal  MS: no gross musculoskeletal defects noted, no edema  SKIN: no suspicious lesions or rashes  NEURO: Normal strength and tone, mentation intact and speech normal  PSYCH: mentation appears normal, affect normal/bright    ASSESSMENT/PLAN:   George was seen today for physical and dizziness.    Diagnoses and all orders for this visit:    Routine adult health maintenance  -     Lipid Profile (Chol, Trig, HDL, LDL calc); Future  -     Glucose; Future  -     Magnesium; Future  -     Vitamin B2; Future  -     Lipid Profile (Chol, Trig, HDL, LDL calc)  -     Glucose  -     Magnesium  -     Vitamin B2  We discussed healthy lifestyle, nutrition, cardiovascular risk reduction, self care, safety, sunscreen, and timing of cancer screening.  Health maintenance screening and immunizations reviewed with the patient.  Follow up yearly for the annual physical.     Vestibular migraine  -     Physical Therapy Referral; " "Future  -     Adult Neurology Referral; Future  I will defer magnesium and riboflavin decision to her.  She had seen ENT in the past.  She stopped taking B12 for over a year because she did not notice any improvement.  She changed to a gluten-free diet but did not notice any improvement.      Patient has been advised of split billing requirements and indicates understanding: Yes  COUNSELING:  Reviewed preventive health counseling, as reflected in patient instructions    Estimated body mass index is 24.03 kg/m  as calculated from the following:    Height as of this encounter: 1.74 m (5' 8.5\").    Weight as of this encounter: 72.7 kg (160 lb 6 oz).        She reports that she has never smoked. She has never used smokeless tobacco.      Counseling Resources:  ATP IV Guidelines  Pooled Cohorts Equation Calculator  Breast Cancer Risk Calculator  BRCA-Related Cancer Risk Assessment: FHS-7 Tool  FRAX Risk Assessment  ICSI Preventive Guidelines  Dietary Guidelines for Americans, 2010  USDA's MyPlate  ASA Prophylaxis  Lung CA Screening    Romero Edmonds MD  [facility]  "

## 2021-09-29 DIAGNOSIS — R42 DIZZINESS: Primary | ICD-10-CM

## 2021-09-29 DIAGNOSIS — G43.809 VESTIBULAR MIGRAINE: ICD-10-CM

## 2021-09-30 LAB — VIT B2 SERPL-MCNC: 3 MCG/L (ref 1–19)

## 2021-10-04 ENCOUNTER — TRANSFERRED RECORDS (OUTPATIENT)
Dept: HEALTH INFORMATION MANAGEMENT | Facility: CLINIC | Age: 36
End: 2021-10-04

## 2021-10-12 ENCOUNTER — HOSPITAL ENCOUNTER (OUTPATIENT)
Dept: OCCUPATIONAL THERAPY | Facility: REHABILITATION | Age: 36
End: 2021-10-12
Attending: FAMILY MEDICINE
Payer: COMMERCIAL

## 2021-10-12 DIAGNOSIS — G44.209 TENSION HEADACHE: Primary | ICD-10-CM

## 2021-10-12 DIAGNOSIS — R42 DIZZINESS: ICD-10-CM

## 2021-10-12 DIAGNOSIS — G43.809 VESTIBULAR MIGRAINE: ICD-10-CM

## 2021-10-12 PROCEDURE — 97112 NEUROMUSCULAR REEDUCATION: CPT | Mod: GO | Performed by: OCCUPATIONAL THERAPIST

## 2021-10-12 PROCEDURE — 97165 OT EVAL LOW COMPLEX 30 MIN: CPT | Mod: GO | Performed by: OCCUPATIONAL THERAPIST

## 2021-10-16 ENCOUNTER — HEALTH MAINTENANCE LETTER (OUTPATIENT)
Age: 36
End: 2021-10-16

## 2021-10-19 ENCOUNTER — HOSPITAL ENCOUNTER (OUTPATIENT)
Dept: PHYSICAL THERAPY | Facility: REHABILITATION | Age: 36
End: 2021-10-19
Payer: COMMERCIAL

## 2021-10-19 DIAGNOSIS — M79.18 MYOFASCIAL PAIN: ICD-10-CM

## 2021-10-19 DIAGNOSIS — R51.9 HEAD AND FACE PAIN: Primary | ICD-10-CM

## 2021-10-19 DIAGNOSIS — G44.209 TENSION HEADACHE: ICD-10-CM

## 2021-10-19 PROCEDURE — 97161 PT EVAL LOW COMPLEX 20 MIN: CPT | Mod: GP | Performed by: PHYSICAL THERAPIST

## 2021-10-19 PROCEDURE — 97110 THERAPEUTIC EXERCISES: CPT | Mod: GP | Performed by: PHYSICAL THERAPIST

## 2021-10-19 NOTE — PROGRESS NOTES
10/19/21 1100   General Information   Type of Visit Initial OP Ortho PT Evaluation   Start of Care Date 10/19/21   Referring Physician Romero Edmonds MD   Date of Order 10/12/21   Certification Required? No   Medical Diagnosis migraines   Body Part(s)   Body Part(s) Cervical Spine   Presentation and Etiology   Pertinent history of current problem (include personal factors and/or comorbidities that impact the POC) She has been having slight imbalance issues for the last year and it is in the R side of her head. There is pressure in her ear as well. There is a pokey pain in the R side of her head as well. The R eye will even feel drousy as well. There are some HA's in the eye brow. There has been some issues with her R hand even now. She isn't able to focus to study/reading due to this. When she is trying to fall asleep she will almost shake and wake up but once asleep she is good. It is very mild N/T into the RUE. She isn't sure what happened a year ago to start this. She went through many, many tests and everybody couldn't give her a great answer. She was diagnosed with vestibular migraines. She really doesn't get the HA's though just more weird symptoms into her R head and the imbalance.    Chronicity Chronic   Pain rating (0-10 point scale) Best (/10);Worst (/10)   Best (/10) 0   Worst (/10) 6   Pain quality E. Shooting   Frequency of pain/symptoms B. Intermittent   Progression of symptoms since onset: Unchanged   Fall Risk Screen   Fall screen completed by PT   Have you fallen 2 or more times in the past year? No   Have you fallen and had an injury in the past year? No   Is patient a fall risk? No   Abuse Screen (yes response referral indicated)   Feels Unsafe at Home or Work/School no   Feels Threatened by Someone no   Does Anyone Try to Keep You From Having Contact with Others or Doing Things Outside Your Home? no   Physical Signs of Abuse Present no   Cervical Spine   Cervical Flexion ROM WFL    Cervical Extension ROM 53    Cervical Right Rotation ROM 70   Cervical Left Rotation ROM 86   Thoracic Right Rotation 60   Thoracic Left Rotation 70   Shoulder Shrug (C2-C4) Strength shoulder flexion: 4/5 B   Shoulder Abd (C5) Strength 4/5 B   Elbow Flexion (C5, C6) Strength 4+/5 B   Elbow Extension (C7) Strength 4/5 B   Thumb Abd (C8) Strength 4/5 B   5th Finger Add (T1) Strength 4/5 B   Palpation increaed fascial tone and tension in her CT junction and cervical paraspinals   Planned Therapy Interventions   Planned Therapy Interventions manual therapy;neuromuscular re-education;ROM;strengthening;stretching   Clinical Impression   Criteria for Skilled Therapeutic Interventions Met yes, treatment indicated   PT Diagnosis cervical/head pain   Clinical Presentation Stable/Uncomplicated   Clinical Decision Making (Complexity) Low complexity   Therapy Frequency 1 time/week   Predicted Duration of Therapy Intervention (days/wks) 12 weeks   Risk & Benefits of therapy have been explained Yes   Patient, Family & other staff in agreement with plan of care Yes   Clinical Impression Comments Pt is 37 y/o female being referred for migraines/head/neck pain. She does present with an increased ANS activation which is likely strongly contributing to her current symtpoms. she is appropriate for skilled PT to reach all stated goals.    ORTHO GOALS   PT Ortho Eval Goals 1;2;3   Ortho Goal 1   Goal Identifier 1   Goal Description Pt will increase C-rot to >80 deg B to imprvoe overall mobility in 12 weeks.    Target Date 01/17/22   Ortho Goal 2   Goal Identifier 2   Goal Description Pt will decrease all pain from 0-6/10 in 12 weeks.    Target Date 01/17/22   Ortho Goal 3   Goal Identifier 3   Goal Description Pt will have no episodes of dizzy/imbalance in 12 weeks.    Target Date 01/17/22   Total Evaluation Time   PT Nciki Low Complexity Minutes (31980) 25

## 2021-10-22 ENCOUNTER — HOSPITAL ENCOUNTER (OUTPATIENT)
Dept: PHYSICAL THERAPY | Facility: REHABILITATION | Age: 36
End: 2021-10-22
Payer: COMMERCIAL

## 2021-10-22 DIAGNOSIS — M79.18 MYOFASCIAL PAIN: ICD-10-CM

## 2021-10-22 DIAGNOSIS — G44.209 TENSION HEADACHE: ICD-10-CM

## 2021-10-22 DIAGNOSIS — R51.9 HEAD AND FACE PAIN: Primary | ICD-10-CM

## 2021-10-22 PROCEDURE — 97110 THERAPEUTIC EXERCISES: CPT | Mod: GP | Performed by: PHYSICAL THERAPIST

## 2021-10-22 PROCEDURE — 97140 MANUAL THERAPY 1/> REGIONS: CPT | Mod: GP | Performed by: PHYSICAL THERAPIST

## 2021-10-22 PROCEDURE — 97112 NEUROMUSCULAR REEDUCATION: CPT | Mod: GP | Performed by: PHYSICAL THERAPIST

## 2021-10-26 ENCOUNTER — HOSPITAL ENCOUNTER (OUTPATIENT)
Dept: OCCUPATIONAL THERAPY | Facility: REHABILITATION | Age: 36
End: 2021-10-26
Payer: COMMERCIAL

## 2021-10-26 DIAGNOSIS — G44.209 TENSION HEADACHE: ICD-10-CM

## 2021-10-26 DIAGNOSIS — R42 DIZZINESS: Primary | ICD-10-CM

## 2021-10-26 DIAGNOSIS — G43.809 VESTIBULAR MIGRAINE: ICD-10-CM

## 2021-10-26 PROCEDURE — 97112 NEUROMUSCULAR REEDUCATION: CPT | Mod: GO | Performed by: OCCUPATIONAL THERAPIST

## 2021-11-16 ENCOUNTER — HOSPITAL ENCOUNTER (OUTPATIENT)
Dept: OCCUPATIONAL THERAPY | Facility: REHABILITATION | Age: 36
End: 2021-11-16
Payer: COMMERCIAL

## 2021-11-16 DIAGNOSIS — G43.809 VESTIBULAR MIGRAINE: ICD-10-CM

## 2021-11-16 DIAGNOSIS — G44.209 TENSION HEADACHE: ICD-10-CM

## 2021-11-16 DIAGNOSIS — R42 DIZZINESS: Primary | ICD-10-CM

## 2021-11-16 PROCEDURE — 97112 NEUROMUSCULAR REEDUCATION: CPT | Mod: GO | Performed by: OCCUPATIONAL THERAPIST

## 2021-11-29 ENCOUNTER — OFFICE VISIT (OUTPATIENT)
Dept: FAMILY MEDICINE | Facility: CLINIC | Age: 36
End: 2021-11-29
Payer: COMMERCIAL

## 2021-11-29 VITALS
DIASTOLIC BLOOD PRESSURE: 72 MMHG | BODY MASS INDEX: 23.82 KG/M2 | HEART RATE: 86 BPM | OXYGEN SATURATION: 100 % | WEIGHT: 159 LBS | SYSTOLIC BLOOD PRESSURE: 110 MMHG

## 2021-11-29 DIAGNOSIS — L72.0 EPIDERMOID CYST OF SKIN: Primary | ICD-10-CM

## 2021-11-29 PROCEDURE — 99213 OFFICE O/P EST LOW 20 MIN: CPT | Performed by: FAMILY MEDICINE

## 2021-11-29 NOTE — PROGRESS NOTES
ASSESSMENT/PLAN:  George was seen today for bump/lesion.    Diagnoses and all orders for this visit:    Epidermoid cyst of skin  Noninfected.  Likely self-limiting.  No further management, including the avoidance of trying to push out the material or squeeze the cyst on her own.  I showed her pictures of epidermoid cyst from [employer].  She verbalized understanding and agree with the plan      SUBJECTIVE:    George Hill is a 36 year old female who came in today     36-year-old female who comes in today for concerns regarding a cyst between her breasts.  She has noted a blackhead like spot on this area for quite some time.  Over the last couple weeks it has increased in size.  Is slightly tender.  She tried squeezing at it but has not had much success with any yield of material from the cyst.    Review of Systems (except those mentioned above)  Constitutional: Negative.   HENT: Negative.   Eyes: Negative.   Respiratory: Negative.   Cardiovascular: Negative.   Gastrointestinal: Negative.   Endocrine: Negative.   Genitourinary: Negative.   Musculoskeletal: Negative.   Skin: Negative.   Allergic/Immunologic: Negative.   Neurological: Negative.   Hematological: Negative.   Psychiatric/Behavioral: Negative.     There are no problems to display for this patient.    No Known Allergies  Current Outpatient Medications   Medication Sig Dispense Refill     cholecalciferol, vitamin D3, 2,000 unit Tab [CHOLECALCIFEROL, VITAMIN D3, 2,000 UNIT TAB] Take 2,000 Units by mouth.       No past medical history on file.  Past Surgical History:   Procedure Laterality Date      SECTION       Social History     Socioeconomic History     Marital status:      Spouse name: Not on file     Number of children: Not on file     Years of education: Not on file     Highest education level: Not on file   Occupational History     Not on file   Tobacco Use     Smoking status: Never Smoker     Smokeless tobacco: Never Used    Substance and Sexual Activity     Alcohol use: No     Drug use: No     Sexual activity: Yes     Partners: Male   Other Topics Concern     Not on file   Social History Narrative     Not on file     Social Determinants of Health     Financial Resource Strain: Not on file   Food Insecurity: Not on file   Transportation Needs: Not on file   Physical Activity: Not on file   Stress: Not on file   Social Connections: Not on file   Intimate Partner Violence: Not on file   Housing Stability: Not on file     Family History   Problem Relation Age of Onset     Colon Cancer Mother      Brain Cancer Father          OBJECTIVE:    Vitals:    11/29/21 1048   BP: 110/72   BP Location: Left arm   Patient Position: Sitting   Cuff Size: Adult Regular   Pulse: 86   SpO2: 100%   Weight: 72.1 kg (159 lb)     Body mass index is 23.82 kg/m .    Physical Exam:  Constitutional: Patient is oriented to person, place, and time. Patient appears well-developed and well-nourished. No distress.   Head: Normocephalic and atraumatic.   Right Ear: External ear normal.   Left Ear: External ear normal.   Eyes: Conjunctivae and EOM are normal. Right eye exhibits no discharge. Left eye exhibits no discharge. No scleral icterus.   Neurological: Patient is alert and oriented to person, place, and time.  Skin: Dermoid cyst on her chest between her breasts.  It is not erythematous.  Does not appear to be infected.

## 2021-12-08 ENCOUNTER — HOSPITAL ENCOUNTER (OUTPATIENT)
Dept: PHYSICAL THERAPY | Facility: REHABILITATION | Age: 36
End: 2021-12-08
Payer: COMMERCIAL

## 2021-12-08 DIAGNOSIS — G44.209 TENSION HEADACHE: ICD-10-CM

## 2021-12-08 DIAGNOSIS — R51.9 HEAD AND FACE PAIN: Primary | ICD-10-CM

## 2021-12-08 DIAGNOSIS — M79.18 MYOFASCIAL PAIN: ICD-10-CM

## 2021-12-08 PROCEDURE — 97112 NEUROMUSCULAR REEDUCATION: CPT | Mod: GP | Performed by: PHYSICAL THERAPIST

## 2021-12-08 PROCEDURE — 97140 MANUAL THERAPY 1/> REGIONS: CPT | Mod: GP | Performed by: PHYSICAL THERAPIST

## 2021-12-08 PROCEDURE — 97110 THERAPEUTIC EXERCISES: CPT | Mod: GP | Performed by: PHYSICAL THERAPIST

## 2021-12-30 NOTE — PROGRESS NOTES
"[facility]    Outpatient Occupational Therapy Discharge Note  Patient: George Hill  : 1985    Beginning/End Dates of Reporting Period:  10-12-21 to 21    Referring Provider: Dr. Romero Edmonds    Therapy Diagnosis: dizziness, unsteadiness on feet, decreased ADL    Client Self Report: Patient reports that she continues to have the \"hissing\" in her left ear. She also continues to have the imbalance(not dizziness)    Goals:     Goal Identifier 1.   Goal Description Patient will be able to work on computer for one hour without dizziness   Target Date 01/10/22   Date Met   21   Progress (detail required for progress note):       Goal Identifier 2.    Goal Description Patient will be able to perform housework without dizziness   Target Date 01/10/22   Date Met   21   Progress (detail required for progress note):       Goal Identifier 3.   Goal Description Patient will be able to prepare meal without dizziness   Target Date 01/10/22   Date Met   21   Progress (detail required for progress note:     Plan:  Discharge from therapy.    "

## 2021-12-30 NOTE — ADDENDUM NOTE
Encounter addended by: Elizabeth Eden, CASH on: 12/30/2021 11:54 AM   Actions taken: Episode resolved, Clinical Note Signed

## 2021-12-31 ENCOUNTER — OFFICE VISIT (OUTPATIENT)
Dept: NEUROLOGY | Facility: CLINIC | Age: 36
End: 2021-12-31
Attending: FAMILY MEDICINE
Payer: COMMERCIAL

## 2021-12-31 VITALS
BODY MASS INDEX: 23.4 KG/M2 | HEIGHT: 69 IN | DIASTOLIC BLOOD PRESSURE: 68 MMHG | SYSTOLIC BLOOD PRESSURE: 103 MMHG | WEIGHT: 158 LBS | HEART RATE: 74 BPM

## 2021-12-31 DIAGNOSIS — G43.809 OTHER MIGRAINE WITHOUT STATUS MIGRAINOSUS, NOT INTRACTABLE: ICD-10-CM

## 2021-12-31 DIAGNOSIS — H93.12 TINNITUS, LEFT: Primary | ICD-10-CM

## 2021-12-31 DIAGNOSIS — R42 DIZZY: ICD-10-CM

## 2021-12-31 PROCEDURE — 99205 OFFICE O/P NEW HI 60 MIN: CPT | Performed by: PSYCHIATRY & NEUROLOGY

## 2021-12-31 ASSESSMENT — MIFFLIN-ST. JEOR: SCORE: 1463.12

## 2021-12-31 NOTE — PROGRESS NOTES
NEUROLOGY OUTPATIENT CONSULT NOTE   Dec 31, 2021     CHIEF COMPLAINT/REASON FOR VISIT/REASON FOR CONSULT  Patient presents with:  New Patient: slight imbalance for more than a year     REASON FOR CONSULTATION- Unsteady feeling    REFERRAL SOURCE  Dr. Romero Roberts*  CC Dr. Romero Roberts*    HISTORY OF PRESENT ILLNESS  George Hill is a 36 year old female seen today for evaluation of an abnormal sensation.  Patient reports that the sensation started about 3 years ago.  Sensation is always there though occasionally can get worse.  Initially it started with more dizzy/vertiginous sensation which is no longer there.  She does not have any associated symptoms.  Does not have any headache with this sensation.  Can happen sitting standing lying down.  Has seen ENT and audiology and did did not find any cause.  Has done vestibular rehab without any benefit.  Had extensive testing done in [place] including an MRI, Dopplers, EEG, vertigo testing and no clear cause was identified.  She was told in [place] that she has vestibular migraines.  Though she does not have any head pain with these symptoms.  She describes the symptoms as more like a static in the brain.  Bending the head backwards does help alleviate the symptoms and bending it forward does make the symptoms worse.  Denies any significant neck pain.    Her migraines are generally on the right side.  They are about once a month.  There is no clear provoking factor.  No associated photophobia or phonophobia.  Tylenol generally works as abortive therapy.    Is also developed some ringing in her left ear.  This was not there before.    Previous history is reviewed and this is unchanged.    PAST MEDICAL/SURGICAL HISTORY  No past medical history on file.  There is no problem list on file for this patient.  Reviewed and noncontributory    FAMILY HISTORY  Family History   Problem Relation Age of Onset     Colon Cancer Mother      Brain Cancer Father   "  Father had some kind of brain cancer.  Also cervical spondylosis.  Mother had colon cancer    SOCIAL HISTORY  Social History     Tobacco Use     Smoking status: Never Smoker     Smokeless tobacco: Never Used   Substance Use Topics     Alcohol use: No     Drug use: No       SYSTEMS REVIEW  Twelve-system ROS was done and other than the HPI this was negative.  Pertinent positives noted in the HPI.    MEDICATIONS  cholecalciferol, vitamin D3, 2,000 unit Tab, [CHOLECALCIFEROL, VITAMIN D3, 2,000 UNIT TAB] Take 2,000 Units by mouth.    No current facility-administered medications on file prior to visit.       PHYSICAL EXAMINATION  VITALS: /68 (BP Location: Left arm, Patient Position: Sitting)   Pulse 74   Ht 1.74 m (5' 8.5\")   Wt 71.7 kg (158 lb)   BMI 23.67 kg/m    GENERAL: Healthy appearing, alert, no acute distress, normal habitus.  CARDIOVASCULAR: Extremities warm and well perfused. Pulses present.   EYES: Funduscopic exam limited.  NEUROLOGICAL:  Patient is awake and oriented to self, place and time.  Attention span is normal.  Memory is grossly intact.  Language is fluent and follows commands appropriately.  Appropriate fund of knowledge. Cranial nerves 2-12 are intact. There is no pronator drift.  Motor exam shows 5/5 strength in all extremities.  Tone is symmetric bilaterally in upper and lower extremities.  Reflexes are symmetric and 2+ brisk in upper extremities and lower extremities. Sensory exam is grossly intact to light touch, pin prick and vibration.  Finger to nose and heel to shin is without dysmetria.  Romberg is negative.  Gait is normal and the patient is able to do tandem walk and walk on toes and heels.      DIAGNOSTICS  RELEVANT LABS  Component      Latest Ref Rng & Units 12/3/2020 9/27/2021   Vitamin B12      213 - 816 pg/mL 1,926 (H)    Magnesium      1.8 - 2.6 mg/dL  2.0   Vitamin B2      1 - 19 mcg/L  3       OUTSIDE RECORDS  Outside referral notes and chart notes were reviewed and " pertinent information has been summarized (in addition to the HPI):-Patient was seen in primary care had a epidermoid cyst on her chest.  Patient also has some headaches.  Was diagnosed with vestibular migraines.  Has seen ENT in the past.  Was put on magnesium and riboflavin.  Has stopped her B12 as she did not notice any improvement.  Exam was noncontributory.    Testing done in [facility] is not available to me.  Per patient report this was all normal.    Occupational Therapy notes for vestibular rehab were reviewed.  No central or peripheral pathology was diagnosed.      IMPRESSION/REPORT/PLAN  Tinnitus, left  Dizzy  Episodic migraines    This is a 36 year old female seen today for evaluation of abnormal sensation/dizzy sensation that has been persistent for the last 3 years.  She does have new symptoms of left-sided tinnitus.  She is a extensive work-up through the neurologist in [place] with all testing being negative.  She is also had [place] and no clear diagnosis.  Vestibular rehab has not helped either.    She does complain of neck extension helping with her symptoms.  Possibly her symptoms could be related to a cervical pathology.  Steal phenomena could also be a possibility.  To better examine her symptoms I will get a MRI of the cervical spine we will also check a CT angiogram to evaluate for causes of tinnitus and her dizzy symptoms.    She does have episodic migraines.  Headache pain does not correlate with her symptoms or the intensity of her symptoms.  I do not suspect she has vestibular migraines.    We will repeat the MRI of the brain since it is unclear what kind of MRI she had back in [facility].  Also check some blood work to look for other causes of her dizziness symptoms.  It is not completely clear she has dizziness based on what she is describing.  Might be more unsteadiness.  Could consider EMG to further evaluate her symptoms down the road.    I can see her back in 1 month  after the testing.    -     CTA Head Neck with Contrast; Future  -     CK total; Future  -     Comprehensive metabolic panel; Future  -     TSH with free T4 reflex; Future  -     ACETYLCHOLINE RECEPTOR BINDING; Future  -     STRIATED MUSCLE ANTIBODY IGG; Future  -     ACETYLCHOLINE MODULATING ANTIBODY; Future  -     ACETYLCHOLINE RECEPTOR BLOCKING SKYLAR; Future  -     TSH with free T4 reflex; Future  -     MR Cervical Spine w/o Contrast; Future  -     MR Brain w/o Contrast; Future    Return in about 1 month (around 1/31/2022) for In-Clinic Visit, After testing.    Over 70 minutes were spent coordinating the care for the patient on the day of the encounter.  This includes previsit, during visit and post visit activities as documented above.  Counseling patient.  Vague symptoms and trying to understand patient's symptoms.  Multiple test ordered.  Reviewing outside records  (Activities include but not inclusive of reviewing chart, reviewing outside records, reviewing labs and imaging study results as well as the images, patient visit time including getting history and exam,  use if applicable, review of test results with the patient and coming up with a plan in a shared model, counseling patient and family, education and answering patient questions, EMR , EMR diagnosis entry and problem list management, medication reconciliation and prescription management if applicable, paperwork if applicable, printing documents and documentation of the visit activities.)  Billing:-4 data points, 4 problem points.      Rhett Piña MD  Neurologist  [facility]  Tel:- [phone]    This note was dictated using voice recognition software.  Any grammatical or context distortions are unintentional and inherent to the software.

## 2021-12-31 NOTE — LETTER
12/31/2021         RE: George Hill  [address] [place] [facility] [address]        Dear Colleague,    Thank you for referring your patient, George Hill, to the [facility] [place]. Please see a copy of my visit note below.    NEUROLOGY OUTPATIENT CONSULT NOTE   Dec 31, 2021     CHIEF COMPLAINT/REASON FOR VISIT/REASON FOR CONSULT  Patient presents with:  New Patient: slight imbalance for more than a year     REASON FOR CONSULTATION- Unsteady feeling    REFERRAL SOURCE  Dr. Romero Roberts*  CC Dr. Romero Roberts*    HISTORY OF PRESENT ILLNESS  George Hill is a 36 year old female seen today for evaluation of an abnormal sensation.  Patient reports that the sensation started about 3 years ago.  Sensation is always there though occasionally can get worse.  Initially it started with more dizzy/vertiginous sensation which is no longer there.  She does not have any associated symptoms.  Does not have any headache with this sensation.  Can happen sitting standing lying down.  Has seen ENT and audiology and did did not find any cause.  Has done vestibular rehab without any benefit.  Had extensive testing done in [place] including an MRI, Dopplers, EEG, vertigo testing and no clear cause was identified.  She was told in [place] that she has vestibular migraines.  Though she does not have any head pain with these symptoms.  She describes the symptoms as more like a static in the brain.  Bending the head backwards does help alleviate the symptoms and bending it forward does make the symptoms worse.  Denies any significant neck pain.    Her migraines are generally on the right side.  They are about once a month.  There is no clear provoking factor.  No associated photophobia or phonophobia.  Tylenol generally works as abortive therapy.    Is also developed some ringing in her left ear.  This was not there before.    Previous history is reviewed and this is  "unchanged.    PAST MEDICAL/SURGICAL HISTORY  No past medical history on file.  There is no problem list on file for this patient.  Reviewed and noncontributory    FAMILY HISTORY  Family History   Problem Relation Age of Onset     Colon Cancer Mother      Brain Cancer Father    Father had some kind of brain cancer.  Also cervical spondylosis.  Mother had colon cancer    SOCIAL HISTORY  Social History     Tobacco Use     Smoking status: Never Smoker     Smokeless tobacco: Never Used   Substance Use Topics     Alcohol use: No     Drug use: No       SYSTEMS REVIEW  Twelve-system ROS was done and other than the HPI this was negative.  Pertinent positives noted in the HPI.    MEDICATIONS  cholecalciferol, vitamin D3, 2,000 unit Tab, [CHOLECALCIFEROL, VITAMIN D3, 2,000 UNIT TAB] Take 2,000 Units by mouth.    No current facility-administered medications on file prior to visit.       PHYSICAL EXAMINATION  VITALS: /68 (BP Location: Left arm, Patient Position: Sitting)   Pulse 74   Ht 1.74 m (5' 8.5\")   Wt 71.7 kg (158 lb)   BMI 23.67 kg/m    GENERAL: Healthy appearing, alert, no acute distress, normal habitus.  CARDIOVASCULAR: Extremities warm and well perfused. Pulses present.   EYES: Funduscopic exam limited.  NEUROLOGICAL:  Patient is awake and oriented to self, place and time.  Attention span is normal.  Memory is grossly intact.  Language is fluent and follows commands appropriately.  Appropriate fund of knowledge. Cranial nerves 2-12 are intact. There is no pronator drift.  Motor exam shows 5/5 strength in all extremities.  Tone is symmetric bilaterally in upper and lower extremities.  Reflexes are symmetric and 2+ brisk in upper extremities and lower extremities. Sensory exam is grossly intact to light touch, pin prick and vibration.  Finger to nose and heel to shin is without dysmetria.  Romberg is negative.  Gait is normal and the patient is able to do tandem walk and walk on toes and " heels.      DIAGNOSTICS  RELEVANT LABS  Component      Latest Ref Rng & Units 12/3/2020 9/27/2021   Vitamin B12      213 - 816 pg/mL 1,926 (H)    Magnesium      1.8 - 2.6 mg/dL  2.0   Vitamin B2      1 - 19 mcg/L  3       OUTSIDE RECORDS  Outside referral notes and chart notes were reviewed and pertinent information has been summarized (in addition to the HPI):-Patient was seen in primary care had a epidermoid cyst on her chest.  Patient also has some headaches.  Was diagnosed with vestibular migraines.  Has seen ENT in the past.  Was put on magnesium and riboflavin.  Has stopped her B12 as she did not notice any improvement.  Exam was noncontributory.    Testing done in [place] is not available to me.  Per patient report this was all normal.    Occupational Therapy notes for vestibular rehab were reviewed.  No central or peripheral pathology was diagnosed.      IMPRESSION/REPORT/PLAN  Tinnitus, left  Dizzy  Episodic migraines    This is a 36 year old female seen today for evaluation of abnormal sensation/dizzy sensation that has been persistent for the last 3 years.  She does have new symptoms of left-sided tinnitus.  She is a extensive work-up through the neurologist in [place] with all testing being negative.  She is also had [place] and no clear diagnosis.  Vestibular rehab has not helped either.    She does complain of neck extension helping with her symptoms.  Possibly her symptoms could be related to a cervical pathology.  Steal phenomena could also be a possibility.  To better examine her symptoms I will get a MRI of the cervical spine we will also check a CT angiogram to evaluate for causes of tinnitus and her dizzy symptoms.    She does have episodic migraines.  Headache pain does not correlate with her symptoms or the intensity of her symptoms.  I do not suspect she has vestibular migraines.    We will repeat the MRI of the brain since it is unclear what kind of MRI she had back in  [facility].  Also check some blood work to look for other causes of her dizziness symptoms.  It is not completely clear she has dizziness based on what she is describing.  Might be more unsteadiness.  Could consider EMG to further evaluate her symptoms down the road.    I can see her back in 1 month after the testing.    -     CTA Head Neck with Contrast; Future  -     CK total; Future  -     Comprehensive metabolic panel; Future  -     TSH with free T4 reflex; Future  -     ACETYLCHOLINE RECEPTOR BINDING; Future  -     STRIATED MUSCLE ANTIBODY IGG; Future  -     ACETYLCHOLINE MODULATING ANTIBODY; Future  -     ACETYLCHOLINE RECEPTOR BLOCKING SKYLAR; Future  -     TSH with free T4 reflex; Future  -     MR Cervical Spine w/o Contrast; Future  -     MR Brain w/o Contrast; Future    Return in about 1 month (around 1/31/2022) for In-Clinic Visit, After testing.    Over 70 minutes were spent coordinating the care for the patient on the day of the encounter.  This includes previsit, during visit and post visit activities as documented above.  Counseling patient.  Vague symptoms and trying to understand patient's symptoms.  Multiple test ordered.  Reviewing outside records  (Activities include but not inclusive of reviewing chart, reviewing outside records, reviewing labs and imaging study results as well as the images, patient visit time including getting history and exam,  use if applicable, review of test results with the patient and coming up with a plan in a shared model, counseling patient and family, education and answering patient questions, EMR , EMR diagnosis entry and problem list management, medication reconciliation and prescription management if applicable, paperwork if applicable, printing documents and documentation of the visit activities.)  Billing:-4 data points, 4 problem points.      Rhett Piña MD  Neurologist  [facility]  Tel:- [phone]    This  note was dictated using voice recognition software.  Any grammatical or context distortions are unintentional and inherent to the software.        Again, thank you for allowing me to participate in the care of your patient.        Sincerely,        Rhett Piña MD

## 2021-12-31 NOTE — NURSING NOTE
Chief Complaint   Patient presents with     New Patient     slight imbalance for more than a year      Patrice Rodgers CMA@ on 12/31/2021 at 10:14 AM

## 2022-01-13 ENCOUNTER — LAB (OUTPATIENT)
Dept: LAB | Facility: CLINIC | Age: 37
End: 2022-01-13
Payer: COMMERCIAL

## 2022-01-13 DIAGNOSIS — H93.12 TINNITUS, LEFT: ICD-10-CM

## 2022-01-13 DIAGNOSIS — R42 DIZZY: ICD-10-CM

## 2022-01-13 LAB
ALBUMIN SERPL-MCNC: 3.8 G/DL (ref 3.5–5)
ALP SERPL-CCNC: 61 U/L (ref 45–120)
ALT SERPL W P-5'-P-CCNC: 12 U/L (ref 0–45)
ANION GAP SERPL CALCULATED.3IONS-SCNC: 10 MMOL/L (ref 5–18)
AST SERPL W P-5'-P-CCNC: 19 U/L (ref 0–40)
BILIRUB SERPL-MCNC: 0.3 MG/DL (ref 0–1)
BUN SERPL-MCNC: 7 MG/DL (ref 8–22)
CALCIUM SERPL-MCNC: 9.3 MG/DL (ref 8.5–10.5)
CHLORIDE BLD-SCNC: 105 MMOL/L (ref 98–107)
CK SERPL-CCNC: 38 U/L (ref 30–190)
CO2 SERPL-SCNC: 24 MMOL/L (ref 22–31)
CREAT SERPL-MCNC: 0.67 MG/DL (ref 0.6–1.1)
GFR SERPL CREATININE-BSD FRML MDRD: >90 ML/MIN/1.73M2
GLUCOSE BLD-MCNC: 84 MG/DL (ref 70–125)
POTASSIUM BLD-SCNC: 4.2 MMOL/L (ref 3.5–5)
PROT SERPL-MCNC: 6.8 G/DL (ref 6–8)
SODIUM SERPL-SCNC: 139 MMOL/L (ref 136–145)
TSH SERPL DL<=0.005 MIU/L-ACNC: 1.2 UIU/ML (ref 0.3–5)

## 2022-01-13 PROCEDURE — 84443 ASSAY THYROID STIM HORMONE: CPT

## 2022-01-13 PROCEDURE — 80053 COMPREHEN METABOLIC PANEL: CPT

## 2022-01-13 PROCEDURE — 83516 IMMUNOASSAY NONANTIBODY: CPT | Mod: 90

## 2022-01-13 PROCEDURE — 36415 COLL VENOUS BLD VENIPUNCTURE: CPT

## 2022-01-13 PROCEDURE — 86255 FLUORESCENT ANTIBODY SCREEN: CPT | Mod: 90

## 2022-01-13 PROCEDURE — 82550 ASSAY OF CK (CPK): CPT

## 2022-01-13 PROCEDURE — 99000 SPECIMEN HANDLING OFFICE-LAB: CPT

## 2022-01-13 PROCEDURE — 83519 RIA NONANTIBODY: CPT | Mod: 90

## 2022-01-16 LAB — STRIA MUS IGG SER QL IF: NORMAL

## 2022-01-17 LAB
ACHR BLOCK AB/ACHR TOTAL SFR SER: 0 %
ACHR MOD AB/ACHR TOTAL SFR SER: 0 %

## 2022-01-19 LAB — ACHR BIND AB SER-SCNC: 0 NMOL/L

## 2022-01-29 ENCOUNTER — HOSPITAL ENCOUNTER (OUTPATIENT)
Dept: CT IMAGING | Facility: CLINIC | Age: 37
Discharge: HOME | End: 2022-01-29
Attending: PSYCHIATRY & NEUROLOGY | Admitting: PSYCHIATRY & NEUROLOGY
Payer: COMMERCIAL

## 2022-01-29 DIAGNOSIS — R42 DIZZY: ICD-10-CM

## 2022-01-29 DIAGNOSIS — H93.12 TINNITUS, LEFT: ICD-10-CM

## 2022-01-29 PROCEDURE — 70498 CT ANGIOGRAPHY NECK: CPT

## 2022-01-29 PROCEDURE — 70496 CT ANGIOGRAPHY HEAD: CPT

## 2022-01-29 PROCEDURE — 250N000011 HC RX IP 250 OP 636: Performed by: PSYCHIATRY & NEUROLOGY

## 2022-01-29 RX ORDER — IOPAMIDOL 755 MG/ML
100 INJECTION, SOLUTION INTRAVASCULAR ONCE
Status: COMPLETED | OUTPATIENT
Start: 2022-01-29 | End: 2022-01-29

## 2022-01-29 RX ADMIN — IOPAMIDOL 75 ML: 755 INJECTION, SOLUTION INTRAVENOUS at 11:28

## 2022-01-31 ENCOUNTER — HOSPITAL ENCOUNTER (OUTPATIENT)
Dept: MRI IMAGING | Facility: CLINIC | Age: 37
End: 2022-01-31
Attending: PSYCHIATRY & NEUROLOGY
Payer: COMMERCIAL

## 2022-01-31 DIAGNOSIS — H93.12 TINNITUS, LEFT: ICD-10-CM

## 2022-01-31 DIAGNOSIS — R42 DIZZY: ICD-10-CM

## 2022-01-31 PROCEDURE — A9585 GADOBUTROL INJECTION: HCPCS | Performed by: PSYCHIATRY & NEUROLOGY

## 2022-01-31 PROCEDURE — 255N000002 HC RX 255 OP 636: Performed by: PSYCHIATRY & NEUROLOGY

## 2022-01-31 PROCEDURE — 70543 MRI ORBT/FAC/NCK W/O &W/DYE: CPT

## 2022-01-31 PROCEDURE — 72141 MRI NECK SPINE W/O DYE: CPT

## 2022-01-31 RX ORDER — GADOBUTROL 604.72 MG/ML
3.5 INJECTION INTRAVENOUS ONCE
Status: COMPLETED | OUTPATIENT
Start: 2022-01-31 | End: 2022-01-31

## 2022-01-31 RX ADMIN — GADOBUTROL 3.5 ML: 604.72 INJECTION INTRAVENOUS at 08:06

## 2022-02-02 ENCOUNTER — OFFICE VISIT (OUTPATIENT)
Dept: NEUROLOGY | Facility: CLINIC | Age: 37
End: 2022-02-02
Payer: COMMERCIAL

## 2022-02-02 VITALS
HEART RATE: 82 BPM | BODY MASS INDEX: 21.07 KG/M2 | DIASTOLIC BLOOD PRESSURE: 80 MMHG | WEIGHT: 139 LBS | SYSTOLIC BLOOD PRESSURE: 141 MMHG | HEIGHT: 68 IN

## 2022-02-02 DIAGNOSIS — F41.9 ANXIETY: Primary | ICD-10-CM

## 2022-02-02 DIAGNOSIS — G43.809 OTHER MIGRAINE WITHOUT STATUS MIGRAINOSUS, NOT INTRACTABLE: ICD-10-CM

## 2022-02-02 DIAGNOSIS — H93.12 TINNITUS, LEFT: ICD-10-CM

## 2022-02-02 DIAGNOSIS — R42 DIZZY: ICD-10-CM

## 2022-02-02 PROCEDURE — 99215 OFFICE O/P EST HI 40 MIN: CPT | Performed by: PSYCHIATRY & NEUROLOGY

## 2022-02-02 RX ORDER — CITALOPRAM HYDROBROMIDE 10 MG/1
10 TABLET ORAL DAILY
Qty: 90 TABLET | Refills: 1 | Status: SHIPPED | OUTPATIENT
Start: 2022-02-02 | End: 2022-02-11

## 2022-02-02 ASSESSMENT — MIFFLIN-ST. JEOR: SCORE: 1369

## 2022-02-02 NOTE — NURSING NOTE
Chief Complaint   Patient presents with     RECHECK     unsteady feeling     Denise Nuñez MA on 2/2/2022 at 2:59 PM

## 2022-02-02 NOTE — PROGRESS NOTES
NEUROLOGY OUTPATIENT PROGRESS NOTE   Feb 2, 2022     CHIEF COMPLAINT/REASON FOR VISIT/REASON FOR CONSULT  Patient presents with:  RECHECK: unsteady feeling    REASON FOR CONSULTATION- Unsteady feeling    REFERRAL SOURCE  Dr. Romero Roberts*  CC Dr. Romero Roberts*    HISTORY OF PRESENT ILLNESS  George Hill is a 36 year old female seen today for evaluation of an abnormal sensation.  Patient reports that the sensation started about 3 years ago.  Sensation is always there though occasionally can get worse.  Initially it started with more dizzy/vertiginous sensation which is no longer there.  She does not have any associated symptoms.  Does not have any headache with this sensation.  Can happen sitting standing lying down.  Has seen ENT and audiology and did did not find any cause.  Has done vestibular rehab without any benefit.  Had extensive testing done in [place] including an MRI, Dopplers, EEG, vertigo testing and no clear cause was identified.  She was told in [place] that she has vestibular migraines.  Though she does not have any head pain with these symptoms.  She describes the symptoms as more like a static in the brain.  Bending the head backwards does help alleviate the symptoms and bending it forward does make the symptoms worse.  Denies any significant neck pain.    Her migraines are generally on the right side.  They are about once a month.  There is no clear provoking factor.  No associated photophobia or phonophobia.  Tylenol generally works as abortive therapy.    Is also developed some ringing in her left ear.  This was not there before.    2/2/22  Patient returns today.  She reports that she continues to have the abnormal sensation.  This is mainly prominent when she bends her head backwards.  Can happen sitting standing.  Does not happen when she is lying down.  Denies any new weakness numbness.    We discussed if her symptoms could be related to anxiety.  She thinks that she is  "anxious all the time though sometimes it can prevent her from doing things.  She does get stressed easily.  Is planning on using meditation to deal with anxiety though is interested in trying medication also.  We did discuss side effects of possible medication risk of worsening depression/suicidal ideation with medication.    Previous history is reviewed and this is unchanged.    PAST MEDICAL/SURGICAL HISTORY  No past medical history on file.  There is no problem list on file for this patient.  Reviewed and noncontributory    FAMILY HISTORY  Family History   Problem Relation Age of Onset     Colon Cancer Mother      Brain Cancer Father    Father had some kind of brain cancer.  Also cervical spondylosis.  Mother had colon cancer    SOCIAL HISTORY  Social History     Tobacco Use     Smoking status: Never Smoker     Smokeless tobacco: Never Used   Substance Use Topics     Alcohol use: No     Drug use: No       SYSTEMS REVIEW  Twelve-system ROS was done and other than the HPI this was negative.  Pertinent positives noted in the HPI.  No new concerns reported today..    MEDICATIONS  cholecalciferol, vitamin D3, 2,000 unit Tab, [CHOLECALCIFEROL, VITAMIN D3, 2,000 UNIT TAB] Take 2,000 Units by mouth.    No current facility-administered medications on file prior to visit.       PHYSICAL EXAMINATION  VITALS: BP (!) 141/80 (BP Location: Right arm, Patient Position: Sitting)   Pulse 82   Ht 1.727 m (5' 8\")   Wt 63 kg (139 lb)   BMI 21.13 kg/m    GENERAL: Healthy appearing, alert, no acute distress, normal habitus.  CARDIOVASCULAR: Extremities warm and well perfused. Pulses present.   NEUROLOGICAL:  Patient is awake and oriented to self, place and time.  Attention span is normal.  Memory is grossly intact.  Language is fluent and follows commands appropriately.  Appropriate fund of knowledge. Cranial nerves 2-12 are intact. There is no pronator drift.  Motor exam shows 5/5 strength in all extremities.  Tone is symmetric " bilaterally in upper and lower extremities.  (Previously reflexes are symmetric and 2+ brisk in upper extremities and lower extremities. Sensory exam is grossly intact to light touch, pin prick and vibration.)  Finger to nose and heel to shin is without dysmetria.  Romberg is negative.  Gait is normal and the patient is able to do tandem walk and walk on toes and heels.  No significant change in exam today.    DIAGNOSTICS  RELEVANT LABS  Component      Latest Ref Rng & Units 12/3/2020 9/27/2021   Vitamin B12      213 - 816 pg/mL 1,926 (H)    Magnesium      1.8 - 2.6 mg/dL  2.0   Vitamin B2      1 - 19 mcg/L  3       OUTSIDE RECORDS  Outside referral notes and chart notes were reviewed and pertinent information has been summarized (in addition to the HPI):-Patient was seen in primary care had a epidermoid cyst on her chest.  Patient also has some headaches.  Was diagnosed with vestibular migraines.  Has seen ENT in the past.  Was put on magnesium and riboflavin.  Has stopped her B12 as she did not notice any improvement.  Exam was noncontributory.    Testing done in [place] is not available to me.  Per patient report this was all normal.    Occupational Therapy notes for vestibular rehab were reviewed.  No central or peripheral pathology was diagnosed.    LABS  Component      Latest Ref Rng & Units 1/13/2022   Sodium      136 - 145 mmol/L 139   Potassium      3.5 - 5.0 mmol/L 4.2   Chloride      98 - 107 mmol/L 105   Carbon Dioxide      22 - 31 mmol/L 24   Anion Gap      5 - 18 mmol/L 10   Urea Nitrogen      8 - 22 mg/dL 7 (L)   Creatinine      0.60 - 1.10 mg/dL 0.67   Calcium      8.5 - 10.5 mg/dL 9.3   Glucose      70 - 125 mg/dL 84   Alkaline Phosphatase      45 - 120 U/L 61   AST      0 - 40 U/L 19   ALT      0 - 45 U/L 12   Protein Total      6.0 - 8.0 g/dL 6.8   Albumin      3.5 - 5.0 g/dL 3.8   Bilirubin Total      0.0 - 1.0 mg/dL 0.3   GFR Estimate      >60 mL/min/1.73m2 >90   CK Total      30 - 190 U/L 38    TSH      0.30 - 5.00 uIU/mL 1.20   AcetChol Binding Beatriz      0.0 - 0.4 nmol/L 0.0   Striated Muscle Antibody IgG      <1:40 <1:40   AcetChol Modul Beatriz      <=45 % 0   AcetChol Block Beatriz      0 - 26 % 0       CTA-images reviewed with the patient and noncontributory  HEAD CT:  1.  Normal head CT.     HEAD CTA:   1.  Normal CTA [place].     NECK CTA:  1.  Normal neck CTA.    MRI-images reviewed with the patient.  Noncontributory.  IMPRESSION:  1.  Normal MRI of the internal auditory canals and labyrinthine structures.     2.  Paranasal membrane thickening as detailed above.    MRI C spine-images reviewed with the patient.  There is mild disc bulge which in certain positions could potentially be causing her symptoms though very unlikely.  IMPRESSION:  1.  No evidence of signal abnormality or expansion within the cervical spinal cord.  2.  No significant posterior disc bulge, spinal canal, or neural foraminal narrowing at any level within the cervical spine.    IMPRESSION/REPORT/PLAN  Tinnitus, left  Dizzy  Episodic migraines  Anxiety    This is a 36 year old female seen today for evaluation of abnormal sensation/dizzy sensation that has been persistent for the last 3 years.  She does have new symptoms of left-sided tinnitus.  She is a extensive work-up through the neurologist in [place] with all testing being negative.  She is also [place] and no clear diagnosis.  Vestibular rehab has not helped either.    She does complain of neck extension helping with her symptoms.  Possibly her symptoms could be related to a cervical pathology.  Steal phenomena could also be a possibility.  MRI cervical spine and CT angiogram head and neck have overall been noncontributory.  MRI C-spine does show very mild spinal stenosis which potentially could be exacerbated in different positions though very unlikely.  If symptoms are worse could check in extension flexion view of the cervical spine.    She  does have episodic migraines.  Headache pain does not correlate with her symptoms or the intensity of her symptoms.  I do not suspect she has vestibular migraines.    We will repeat the MRI of the brain since it is unclear what kind of MRI she had back in [place].  MRI brain came back noncontributory.  Blood work has been noncontributory as well.  She does not really acknowledge unsteadiness and will hold off on EMG as previously discussed.     Given extensive testing everything coming back normal possibly her symptoms could be related to anxiety.  I reassured her there is nothing life-threatening going on since all the testing came back negative.  She is interested in trying some medication for anxiety and I did prescribe her Celexa.  Discussed side effects and paradoxical response to Celexa.  She wants to also try meditation for anxiety which would be a good idea.    She wants to follow back on as-needed basis    -     citalopram (CELEXA) 10 MG tablet; Take 1 tablet (10 mg) by mouth daily    Return if symptoms worsen or fail to improve, for In-Clinic Visit (must).    Over 42 minutes were spent coordinating the care for the patient on the day of the encounter.  This includes previsit, during visit and post visit activities as documented above.  Multiple problems reviewed/addressed.  Prescription management.  Multiple tests reviewed/images reviewed with the patient  (Activities include but not inclusive of reviewing chart, reviewing outside records, reviewing labs and imaging study results as well as the images, patient visit time including getting history and exam,  use if applicable, review of test results with the patient and coming up with a plan in a shared model, counseling patient and family, education and answering patient questions, EMR , EMR diagnosis entry and problem list management, medication reconciliation and prescription management if applicable, paperwork if applicable, printing  documents and documentation of the visit activities.)    Rhett Piña MD  Neurologist  [facility]  Tel:- [phone]    This note was dictated using voice recognition software.  Any grammatical or context distortions are unintentional and inherent to the software.

## 2022-02-02 NOTE — LETTER
2/2/2022         RE: George Hill  [address] [facility] [place] [address]        Dear Colleague,    Thank you for referring your patient, George Hill, to the [facility] [place]. Please see a copy of my visit note below.    NEUROLOGY OUTPATIENT PROGRESS NOTE   Feb 2, 2022     CHIEF COMPLAINT/REASON FOR VISIT/REASON FOR CONSULT  Patient presents with:  RECHECK: unsteady feeling    REASON FOR CONSULTATION- Unsteady feeling    REFERRAL SOURCE  Dr. Romero Roberts*  CC Dr. Romero Roberts*    HISTORY OF PRESENT ILLNESS  George Hill is a 36 year old female seen today for evaluation of an abnormal sensation.  Patient reports that the sensation started about 3 years ago.  Sensation is always there though occasionally can get worse.  Initially it started with more dizzy/vertiginous sensation which is no longer there.  She does not have any associated symptoms.  Does not have any headache with this sensation.  Can happen sitting standing lying down.  Has seen ENT and audiology and did did not find any cause.  Has done vestibular rehab without any benefit.  Had extensive testing done in [place] including an MRI, Dopplers, EEG, vertigo testing and no clear cause was identified.  She was told in [place] that she has vestibular migraines.  Though she does not have any head pain with these symptoms.  She describes the symptoms as more like a static in the brain.  Bending the head backwards does help alleviate the symptoms and bending it forward does make the symptoms worse.  Denies any significant neck pain.    Her migraines are generally on the right side.  They are about once a month.  There is no clear provoking factor.  No associated photophobia or phonophobia.  Tylenol generally works as abortive therapy.    Is also developed some ringing in her left ear.  This was not there before.    2/2/22  Patient returns today.  She reports that she continues to have the abnormal  "sensation.  This is mainly prominent when she bends her head backwards.  Can happen sitting standing.  Does not happen when she is lying down.  Denies any new weakness numbness.    We discussed if her symptoms could be related to anxiety.  She thinks that she is anxious all the time though sometimes it can prevent her from doing things.  She does get stressed easily.  Is planning on using meditation to deal with anxiety though is interested in trying medication also.  We did discuss side effects of possible medication risk of worsening depression/suicidal ideation with medication.    Previous history is reviewed and this is unchanged.    PAST MEDICAL/SURGICAL HISTORY  No past medical history on file.  There is no problem list on file for this patient.  Reviewed and noncontributory    FAMILY HISTORY  Family History   Problem Relation Age of Onset     Colon Cancer Mother      Brain Cancer Father    Father had some kind of brain cancer.  Also cervical spondylosis.  Mother had colon cancer    SOCIAL HISTORY  Social History     Tobacco Use     Smoking status: Never Smoker     Smokeless tobacco: Never Used   Substance Use Topics     Alcohol use: No     Drug use: No       SYSTEMS REVIEW  Twelve-system ROS was done and other than the HPI this was negative.  Pertinent positives noted in the HPI.  No new concerns reported today..    MEDICATIONS  cholecalciferol, vitamin D3, 2,000 unit Tab, [CHOLECALCIFEROL, VITAMIN D3, 2,000 UNIT TAB] Take 2,000 Units by mouth.    No current facility-administered medications on file prior to visit.       PHYSICAL EXAMINATION  VITALS: BP (!) 141/80 (BP Location: Right arm, Patient Position: Sitting)   Pulse 82   Ht 1.727 m (5' 8\")   Wt 63 kg (139 lb)   BMI 21.13 kg/m    GENERAL: Healthy appearing, alert, no acute distress, normal habitus.  CARDIOVASCULAR: Extremities warm and well perfused. Pulses present.   NEUROLOGICAL:  Patient is awake and oriented to self, place and time.  Attention " span is normal.  Memory is grossly intact.  Language is fluent and follows commands appropriately.  Appropriate fund of knowledge. Cranial nerves 2-12 are intact. There is no pronator drift.  Motor exam shows 5/5 strength in all extremities.  Tone is symmetric bilaterally in upper and lower extremities.  (Previously reflexes are symmetric and 2+ brisk in upper extremities and lower extremities. Sensory exam is grossly intact to light touch, pin prick and vibration.)  Finger to nose and heel to shin is without dysmetria.  Romberg is negative.  Gait is normal and the patient is able to do tandem walk and walk on toes and heels.  No significant change in exam today.    DIAGNOSTICS  RELEVANT LABS  Component      Latest Ref Rng & Units 12/3/2020 9/27/2021   Vitamin B12      213 - 816 pg/mL 1,926 (H)    Magnesium      1.8 - 2.6 mg/dL  2.0   Vitamin B2      1 - 19 mcg/L  3       OUTSIDE RECORDS  Outside referral notes and chart notes were reviewed and pertinent information has been summarized (in addition to the HPI):-Patient was seen in primary care had a epidermoid cyst on her chest.  Patient also has some headaches.  Was diagnosed with vestibular migraines.  Has seen ENT in the past.  Was put on magnesium and riboflavin.  Has stopped her B12 as she did not notice any improvement.  Exam was noncontributory.    Testing done in [place] is not available to me.  Per patient report this was all normal.    Occupational Therapy notes for vestibular rehab were reviewed.  No central or peripheral pathology was diagnosed.    LABS  Component      Latest Ref Rng & Units 1/13/2022   Sodium      136 - 145 mmol/L 139   Potassium      3.5 - 5.0 mmol/L 4.2   Chloride      98 - 107 mmol/L 105   Carbon Dioxide      22 - 31 mmol/L 24   Anion Gap      5 - 18 mmol/L 10   Urea Nitrogen      8 - 22 mg/dL 7 (L)   Creatinine      0.60 - 1.10 mg/dL 0.67   Calcium      8.5 - 10.5 mg/dL 9.3   Glucose      70 - 125 mg/dL 84   Alkaline Phosphatase       45 - 120 U/L 61   AST      0 - 40 U/L 19   ALT      0 - 45 U/L 12   Protein Total      6.0 - 8.0 g/dL 6.8   Albumin      3.5 - 5.0 g/dL 3.8   Bilirubin Total      0.0 - 1.0 mg/dL 0.3   GFR Estimate      >60 mL/min/1.73m2 >90   CK Total      30 - 190 U/L 38   TSH      0.30 - 5.00 uIU/mL 1.20   AcetChol Binding Beatriz      0.0 - 0.4 nmol/L 0.0   Striated Muscle Antibody IgG      <1:40 <1:40   AcetChol Modul Beatriz      <=45 % 0   AcetChol Block Beatriz      0 - 26 % 0       CTA-images reviewed with the patient and noncontributory  HEAD CT:  1.  Normal head CT.     HEAD CTA:   1.  Normal CTA [place] [facility] [place].     NECK CTA:  1.  Normal neck CTA.    MRI-images reviewed with the patient.  Noncontributory.  IMPRESSION:  1.  Normal MRI of the internal auditory canals and labyrinthine structures.     2.  Paranasal membrane thickening as detailed above.    MRI C spine-images reviewed with the patient.  There is mild disc bulge which in certain positions could potentially be causing her symptoms though very unlikely.  IMPRESSION:  1.  No evidence of signal abnormality or expansion within the cervical spinal cord.  2.  No significant posterior disc bulge, spinal canal, or neural foraminal narrowing at any level within the cervical spine.    IMPRESSION/REPORT/PLAN  Tinnitus, left  Dizzy  Episodic migraines  Anxiety    This is a 36 year old female seen today for evaluation of abnormal sensation/dizzy sensation that has been persistent for the last 3 years.  She does have new symptoms of left-sided tinnitus.  She is a extensive work-up through the neurologist in [place] with all testing being negative.  She is also had [place] and no clear diagnosis.  Vestibular rehab has not helped either.    She does complain of neck extension helping with her symptoms.  Possibly her symptoms could be related to a cervical pathology.  Steal phenomena could also be a possibility.  MRI cervical spine and CT angiogram head and neck have  overall been noncontributory.  MRI C-spine does show very mild spinal stenosis which potentially could be exacerbated in different positions though very unlikely.  If symptoms are worse could check in extension flexion view of the cervical spine.    She does have episodic migraines.  Headache pain does not correlate with her symptoms or the intensity of her symptoms.  I do not suspect she has vestibular migraines.    We will repeat the MRI of the brain since it is unclear what kind of MRI she had back in [place].  MRI brain came back noncontributory.  Blood work has been noncontributory as well.  She does not really acknowledge unsteadiness and will hold off on EMG as previously discussed.     Given extensive testing everything coming back normal possibly her symptoms could be related to anxiety.  I reassured her there is nothing life-threatening going on since all the testing came back negative.  She is interested in trying some medication for anxiety and I did prescribe her Celexa.  Discussed side effects and paradoxical response to Celexa.  She wants to also try meditation for anxiety which would be a good idea.    She wants to follow back on as-needed basis    -     citalopram (CELEXA) 10 MG tablet; Take 1 tablet (10 mg) by mouth daily    Return if symptoms worsen or fail to improve, for In-Clinic Visit (must).    Over 42 minutes were spent coordinating the care for the patient on the day of the encounter.  This includes previsit, during visit and post visit activities as documented above.  Multiple problems reviewed/addressed.  Prescription management.  Multiple tests reviewed/images reviewed with the patient  (Activities include but not inclusive of reviewing chart, reviewing outside records, reviewing labs and imaging study results as well as the images, patient visit time including getting history and exam,  use if applicable, review of test results with the patient and coming up with a plan in a  shared model, counseling patient and family, education and answering patient questions, EMR , EMR diagnosis entry and problem list management, medication reconciliation and prescription management if applicable, paperwork if applicable, printing documents and documentation of the visit activities.)    Rhett Piña MD  Neurologist  [facility]  Tel:- [phone]    This note was dictated using voice recognition software.  Any grammatical or context distortions are unintentional and inherent to the software.        Again, thank you for allowing me to participate in the care of your patient.        Sincerely,        Rhett Piña MD

## 2022-02-11 ENCOUNTER — OFFICE VISIT (OUTPATIENT)
Dept: FAMILY MEDICINE | Facility: CLINIC | Age: 37
End: 2022-02-11
Payer: COMMERCIAL

## 2022-02-11 VITALS
HEART RATE: 90 BPM | WEIGHT: 159 LBS | OXYGEN SATURATION: 100 % | DIASTOLIC BLOOD PRESSURE: 68 MMHG | SYSTOLIC BLOOD PRESSURE: 110 MMHG | BODY MASS INDEX: 24.18 KG/M2

## 2022-02-11 DIAGNOSIS — R10.2 VAGINAL PAIN: ICD-10-CM

## 2022-02-11 DIAGNOSIS — R30.0 DYSURIA: Primary | ICD-10-CM

## 2022-02-11 LAB
ALBUMIN UR-MCNC: NEGATIVE MG/DL
APPEARANCE UR: CLEAR
BACTERIA #/AREA URNS HPF: ABNORMAL /HPF
BILIRUB UR QL STRIP: NEGATIVE
CLUE CELLS: ABNORMAL
COLOR UR AUTO: YELLOW
GLUCOSE UR STRIP-MCNC: NEGATIVE MG/DL
HGB UR QL STRIP: ABNORMAL
KETONES UR STRIP-MCNC: NEGATIVE MG/DL
LEUKOCYTE ESTERASE UR QL STRIP: NEGATIVE
NITRATE UR QL: NEGATIVE
PH UR STRIP: 7 [PH] (ref 5–8)
RBC #/AREA URNS AUTO: ABNORMAL /HPF
SP GR UR STRIP: 1.01 (ref 1–1.03)
SQUAMOUS #/AREA URNS AUTO: ABNORMAL /LPF
TRICHOMONAS, WET PREP: ABNORMAL
UROBILINOGEN UR STRIP-ACNC: 0.2 E.U./DL
WBC #/AREA URNS AUTO: ABNORMAL /HPF
WBC'S/HIGH POWER FIELD, WET PREP: ABNORMAL
YEAST, WET PREP: ABNORMAL

## 2022-02-11 PROCEDURE — 87210 SMEAR WET MOUNT SALINE/INK: CPT | Performed by: FAMILY MEDICINE

## 2022-02-11 PROCEDURE — 99214 OFFICE O/P EST MOD 30 MIN: CPT | Performed by: FAMILY MEDICINE

## 2022-02-11 PROCEDURE — 81001 URINALYSIS AUTO W/SCOPE: CPT | Performed by: FAMILY MEDICINE

## 2022-02-11 RX ORDER — NITROFURANTOIN 25; 75 MG/1; MG/1
100 CAPSULE ORAL 2 TIMES DAILY
Qty: 6 CAPSULE | Refills: 0 | Status: SHIPPED | OUTPATIENT
Start: 2022-02-11 | End: 2022-03-03

## 2022-02-11 RX ORDER — METRONIDAZOLE 7.5 MG/G
1 GEL VAGINAL DAILY
Qty: 70 G | Refills: 0 | Status: SHIPPED | OUTPATIENT
Start: 2022-02-11 | End: 2022-03-03

## 2022-02-11 ASSESSMENT — ENCOUNTER SYMPTOMS
DIZZINESS: 0
DYSURIA: 1
VOMITING: 0
FEVER: 0
NAUSEA: 1
MYALGIAS: 0
CHILLS: 0
ARTHRALGIAS: 0
WEAKNESS: 0
FATIGUE: 0
ABDOMINAL PAIN: 1

## 2022-02-11 NOTE — PROGRESS NOTES
Assessment & Plan     Dysuria  - UA Macro with Reflex to Micro and Culture - lab collect  - UA Macro with Reflex to Micro and Culture - lab collect  - Urine Microscopic  - nitroFURantoin macrocrystal-monohydrate (MACROBID) 100 MG capsule  Dispense: 6 capsule; Refill: 0    Vaginal pain  - Wet prep - Clinic Collect  - metroNIDAZOLE (METROGEL) 0.75 % vaginal gel  Dispense: 70 g; Refill: 0  The UA was reviewed and looks normal except for a trace blood but the microscopy showed squamous epithelial cells in the urine.  Wet prep only shows WBC with no clue cells, yeast, or trichomonas.  But based on the severity of her symptoms I am going to go ahead and treat her empirically for possible upper tract infection and give her a prescription for metronidazole for the vaginal irritation.  She will let us know if there is no improvement in her symptoms.      No follow-ups on file.    Susan Li MD  [facility] [place]    Barrie Vazquez is a 36 year old who presents for the following health issues     Dysuria  This is a new problem. The current episode started in the past 7 days. The problem occurs constantly. The problem has been gradually worsening. Associated symptoms include abdominal pain, nausea and urinary symptoms. Pertinent negatives include no arthralgias, chills, fatigue, fever, myalgias, vomiting or weakness. Associated symptoms comments: Also noted having vaginal irritation with minimal vaginal discharge.. Nothing aggravates the symptoms. She has tried drinking (had also taking cranberry juice and vitamin C) for the symptoms. The treatment provided no relief.        Family History   Problem Relation Age of Onset     Colon Cancer Mother      Brain Cancer Father       Social History     Socioeconomic History     Marital status:      Spouse name: Not on file     Number of children: Not on file     Years of education: Not on file     Highest education level: Not on file    Occupational History     Not on file   Tobacco Use     Smoking status: Never Smoker     Smokeless tobacco: Never Used   Substance and Sexual Activity     Alcohol use: No     Drug use: No     Sexual activity: Yes     Partners: Male   Other Topics Concern     Parent/sibling w/ CABG, MI or angioplasty before 65F 55M? Not Asked   Social History Narrative     Not on file     Social Determinants of Health     Financial Resource Strain: Not on file   Food Insecurity: Not on file   Transportation Needs: Not on file   Physical Activity: Not on file   Stress: Not on file   Social Connections: Not on file   Intimate Partner Violence: Not on file   Housing Stability: Not on file      Past Surgical History:   Procedure Laterality Date      SECTION  2013      No past medical history on file.     Review of Systems   Constitutional: Negative for chills, fatigue and fever.   HENT: Negative.    Gastrointestinal: Positive for abdominal pain and nausea. Negative for vomiting.        Lower suprapubic area   Genitourinary: Positive for dysuria.   Musculoskeletal: Negative for arthralgias and myalgias.   Neurological: Negative for dizziness and weakness.          Objective    /68 (BP Location: Left arm, Patient Position: Sitting, Cuff Size: Adult Regular)   Pulse 90   Wt 72.1 kg (159 lb)   SpO2 100%   BMI 24.18 kg/m    Body mass index is 24.18 kg/m .  Physical Exam  Cardiovascular:      Pulses: Normal pulses.   Pulmonary:      Effort: Pulmonary effort is normal.      Comments: No signs of respiratory distress.  Abdominal:      General: Abdomen is flat. There is no distension.      Tenderness: There is no abdominal tenderness. There is no right CVA tenderness or left CVA tenderness.   Skin:     General: Skin is warm.   Neurological:      Mental Status: She is alert.

## 2022-02-17 ENCOUNTER — OFFICE VISIT (OUTPATIENT)
Dept: FAMILY MEDICINE | Facility: CLINIC | Age: 37
End: 2022-02-17
Payer: COMMERCIAL

## 2022-02-17 VITALS
SYSTOLIC BLOOD PRESSURE: 102 MMHG | DIASTOLIC BLOOD PRESSURE: 56 MMHG | WEIGHT: 154.3 LBS | BODY MASS INDEX: 23.46 KG/M2 | HEART RATE: 92 BPM

## 2022-02-17 DIAGNOSIS — R30.0 DYSURIA: Primary | ICD-10-CM

## 2022-02-17 PROCEDURE — 99214 OFFICE O/P EST MOD 30 MIN: CPT | Performed by: FAMILY MEDICINE

## 2022-02-17 RX ORDER — SULFAMETHOXAZOLE/TRIMETHOPRIM 800-160 MG
1 TABLET ORAL 2 TIMES DAILY
Qty: 6 TABLET | Refills: 0 | Status: SHIPPED | OUTPATIENT
Start: 2022-02-17 | End: 2022-02-20

## 2022-03-03 ENCOUNTER — OFFICE VISIT (OUTPATIENT)
Dept: FAMILY MEDICINE | Facility: CLINIC | Age: 37
End: 2022-03-03
Payer: COMMERCIAL

## 2022-03-03 VITALS
SYSTOLIC BLOOD PRESSURE: 112 MMHG | HEART RATE: 79 BPM | BODY MASS INDEX: 23.47 KG/M2 | DIASTOLIC BLOOD PRESSURE: 60 MMHG | OXYGEN SATURATION: 100 % | WEIGHT: 154.38 LBS

## 2022-03-03 DIAGNOSIS — M79.10 MYALGIA: Primary | ICD-10-CM

## 2022-03-03 LAB
ERYTHROCYTE [DISTWIDTH] IN BLOOD BY AUTOMATED COUNT: 13.2 % (ref 10–15)
HCT VFR BLD AUTO: 35.9 % (ref 35–47)
HGB BLD-MCNC: 11.8 G/DL (ref 11.7–15.7)
MCH RBC QN AUTO: 26.2 PG (ref 26.5–33)
MCHC RBC AUTO-ENTMCNC: 32.9 G/DL (ref 31.5–36.5)
MCV RBC AUTO: 80 FL (ref 78–100)
PLATELET # BLD AUTO: 295 10E3/UL (ref 150–450)
RBC # BLD AUTO: 4.51 10E6/UL (ref 3.8–5.2)
WBC # BLD AUTO: 6.1 10E3/UL (ref 4–11)

## 2022-03-03 PROCEDURE — 36415 COLL VENOUS BLD VENIPUNCTURE: CPT | Performed by: FAMILY MEDICINE

## 2022-03-03 PROCEDURE — 82306 VITAMIN D 25 HYDROXY: CPT | Performed by: FAMILY MEDICINE

## 2022-03-03 PROCEDURE — 85027 COMPLETE CBC AUTOMATED: CPT | Performed by: FAMILY MEDICINE

## 2022-03-03 PROCEDURE — 82607 VITAMIN B-12: CPT | Performed by: FAMILY MEDICINE

## 2022-03-03 PROCEDURE — 82728 ASSAY OF FERRITIN: CPT | Performed by: FAMILY MEDICINE

## 2022-03-03 PROCEDURE — 99214 OFFICE O/P EST MOD 30 MIN: CPT | Performed by: FAMILY MEDICINE

## 2022-03-04 LAB
DEPRECATED CALCIDIOL+CALCIFEROL SERPL-MC: 27 UG/L (ref 30–80)
FERRITIN SERPL-MCNC: 18 NG/ML (ref 10–130)
VIT B12 SERPL-MCNC: 854 PG/ML (ref 213–816)

## 2022-03-04 NOTE — PROGRESS NOTES
"ASSESSMENT/PLAN:  George was seen today for loss weight and body pain.    Diagnoses and all orders for this visit:    Myalgia  -     CBC with platelets; Future  -     Ferritin; Future  -     Vitamin B12; Future  -     Vitamin D Deficiency; Future  -     CBC with platelets  -     Ferritin  -     Vitamin B12  -     Vitamin D Deficiency  I suspect anxiety may be contributing to her symptom  Advise that she takes celexa given to her by neurology but she seemed hesitant    Chr dizziness  Spontaneously resolved  I reviewed neurology notes from 2/2/22 and agree with anxiety being the primary differential     SUBJECTIVE:    George Hill is a 36 year old female who came in today     Long h/o dizziness and has seen neurology recently and neurology years ago in [place]  Felt better after finding out her MRI was unremarkable  Dizziness is reported to have been resolved  I reviewed neurology notes from 2/2/22    Shortly thereafter however she has developed pain   Pain of back, arms, and legs x 15 days  Constant and daily pain  She reported \"weight loss as a result of the pain\". Per patient, weight loss of 14 lbs of about 4-6 months  Has COVID 2 months ago and has recovered  H/o anemia & vitamin D insufficiency. Currently taking vitamin D  No rash  Not on any special diet  Denies anxiety    Review of Systems (except those mentioned above)  Constitutional: Negative.   HENT: Negative.   Eyes: Negative.   Respiratory: Negative.   Cardiovascular: Negative.   Gastrointestinal: Negative.   Endocrine: Negative.   Genitourinary: Negative.   Musculoskeletal: Negative.   Skin: Negative.   Allergic/Immunologic: Negative.   Neurological: Negative.   Hematological: Negative.   Psychiatric/Behavioral: Negative.     There are no problems to display for this patient.    No Known Allergies  Current Outpatient Medications   Medication Sig Dispense Refill     cholecalciferol, vitamin D3, 2,000 unit Tab Take 2,000 Units by mouth        No past " medical history on file.  Past Surgical History:   Procedure Laterality Date      SECTION  2013     Social History     Socioeconomic History     Marital status:      Spouse name: None     Number of children: None     Years of education: None     Highest education level: None   Occupational History     None   Tobacco Use     Smoking status: Never Smoker     Smokeless tobacco: Never Used   Substance and Sexual Activity     Alcohol use: No     Drug use: No     Sexual activity: Yes     Partners: Male   Other Topics Concern     Parent/sibling w/ CABG, MI or angioplasty before 65F 55M? Not Asked   Social History Narrative     None     Social Determinants of Health     Financial Resource Strain: Not on file   Food Insecurity: Not on file   Transportation Needs: Not on file   Physical Activity: Not on file   Stress: Not on file   Social Connections: Not on file   Intimate Partner Violence: Not on file   Housing Stability: Not on file     Family History   Problem Relation Age of Onset     Colon Cancer Mother      Brain Cancer Father          OBJECTIVE:    Vitals:    22 1406   BP: 112/60   Pulse: 79   SpO2: 100%   Weight: 70 kg (154 lb 6 oz)     Body mass index is 23.47 kg/m .    Physical Exam:  Constitutional: Patient is oriented to person, place, and time. Patient appears well-developed and well-nourished. No distress.   Head: Normocephalic and atraumatic.   Right Ear: External ear normal.   Left Ear: External ear normal.   Eyes: Conjunctivae and EOM are normal. Right eye exhibits no discharge. Left eye exhibits no discharge. No scleral icterus.   Neurological: Patient is alert and oriented to person, place, and time.  Skin: No rash noted. Patient is not diaphoretic. No erythema. No pallor.    Results for orders placed or performed in visit on 22   CBC with platelets     Status: Abnormal   Result Value Ref Range    WBC Count 6.1 4.0 - 11.0 10e3/uL    RBC Count 4.51 3.80 - 5.20 10e6/uL     Hemoglobin 11.8 11.7 - 15.7 g/dL    Hematocrit 35.9 35.0 - 47.0 %    MCV 80 78 - 100 fL    MCH 26.2 (L) 26.5 - 33.0 pg    MCHC 32.9 31.5 - 36.5 g/dL    RDW 13.2 10.0 - 15.0 %    Platelet Count 295 150 - 450 10e3/uL

## 2022-03-07 NOTE — PROGRESS NOTES
10/12/21 1600   Quick Adds   Type of Visit Initial Outpatient Occupational Therapy Evaluation   General Information   Start Of Care Date 10/12/21   Referring Physician Dr. Romero Edmonds   Orders Evaluate and treat as indicated   Orders Date 09/29/21   Medical Diagnosis vestibular migraine   Onset of Illness/Injury or Date of Surgery 12/15/20   Surgical/Medical History Reviewed Yes   Additional Occupational Profile Info/Pertinent History of Current Problem Patient had sudden onset of dizziness, headache and unsteadiness in December 2020. She saw ENT and did have MRI, VNG and was told the results were normal. (had this done in [place]). Patient denies hearing changes or nausea. She has occasional right ear pain and low pitch tinnitus. She has tried B12 and gluten free diet but neither were helpful. She recently saw a neurologist and started on riboflavin and magnesium oxide to see if this helps the vestibular migraine. Patient had VNG results on her phone and calorics did show a 28% right hypofunction.   Pain   Patient currently in pain No   Fall Risk Screen   Fall screen completed by OT   Have you fallen 2 or more times in the past year? No   Have you fallen and had an injury in the past year? No   Is patient a fall risk? No   Abuse Screen (yes response referral indicated)   Feels Unsafe at Home or Work/School no   Feels Threatened by Someone no   Does Anyone Try to Keep You From Having Contact with Others or Doing Things Outside Your Home? no   Physical Signs of Abuse Present no   Planned Therapy Interventions   Planned Therapy Interventions Neuromuscular re-education    OT Goal 1   Goal Identifier 1.   Goal Description Patient will be able to work on computer for one hour without dizziness   Target Date 01/10/22    OT Goal 2   Goal Identifier 2.    Goal Description Patient will be able to perform housework without dizziness   Target Date 01/10/22    OT Goal 3   Goal Identifier 3.   Goal Description  " [facility]   Physical Therapy Treatment Note     Name: Bell Lynn  Clinic Number: [id number]    Therapy Diagnosis:   Encounter Diagnoses   Name Primary?    Gait abnormality Yes    Decreased strength of lower extremity     Decreased range of motion of left knee      Physician: Patricia Andrews PA-C    Visit Date: 3/7/2022  Physician Orders: PT Eval and Treat   Medical Diagnosis from Referral:   M17.12 (ICD-10-CM) - Primary osteoarthritis of left knee   Z96.652 (ICD-10-CM) - S/P total knee arthroplasty, left     Evaluation Date: 2/3/2022  Authorization Period Expiration: 2022  Plan of Care Expiration: 2022  Visit # / Visits authorized:  + eval    PTA Visit #: 0 / 5      Time In: 1000 am  Time Out: 1058 pm  Total Treatment Time: 58 minutes   Total Billable Time: 58 minutes (1 MT, 3 TE)    Precautions: Standard, L TKA DOS: 01/10/2022    SUBJECTIVE     Patient reports: she had a bad weekend with sharp pains all around her knee cap with any movement  She was compliant with home exercise program day before yesterday.   Response to previous treatment: good, no adverse reaction; appropriate muscle fatigue  Functional change: ambulated into clinic with no AD    Pain: 5/10 pre-treatment; 0/10 post-treatment  Location: Left knee      OBJECTIVE     Objective Measures updated at progress report unless specified.     DOS 1/10/2022    2022 Lacking 2 degrees of extension at rest; 0 degrees of active extension with heel prop   30 second STS: 7x with no UE assist   TU seconds with no AD  2022 101 degrees   30 second STS: 9x with no UE assist   TUG: 15 seconds with no AD  2022: 0-5-95 pre treatment    0-0- 106 post- treatment    2022: ROM lacking 2-3 degrees at rest; 0 degrees of active extension    2022: PROM : 0-0-109   30" STS: 8 reps without UE support   TU.5 sec w/ AD    2022: PROM: 0-0-110 (post-treatment)   30'' STS: 9 c/ no " "UEsupport                TUG : 13'' c/ no AD     3/2/2022: L Passive Range of Motion 0-0-111 (post treatment)      3/7/2022 PROM: 0-0-113(post-treatment)   30'' STS: 10 c/ no UEsupport    TUG : 10'' c/ no AD                Treatment     Bell received the treatments listed below:      NEUROMUSCULAR RE-EDUCATION ACTIVITIES to improve Balance, Coordination, Kinesthetic, Sense, Proprioception and Posture for 00 minutes. The following were included:   - Quad set w/ 1/2 foam under knee; 5" hold; 30x  - SL hip Abduction; 2 x 15- emphasis on anterior hip rotation and quad set at start  - Standing HS curl; 2 x 10    THERAPEUTIC EXERCISES to develop strength, endurance, ROM, flexibility, posture and core stabilization for 43 minutes including:  - Aerobic activity: Nustep; seat 6; Lv 4.0; 8 min for increased tissue extensibility and CV endurance  - Standing gastroc stretch; 3 x 30"  - Long arc quads: 3 x 15, 5" hold; 6#   - Standing knee flexion at stairs; 10 x 10"     - Fwd/bkwd walking in //; 8 laps w/ cues for quad set during SLS   - Mini-squats at chair with Airex; 2 x10 - emphasis on hip hinge   - SLR flexion: 3 sets of 10 reps with cues for total relax each rep      Not today:   - Aerobic Activity: recumbent bike; seat 8; Lv 3. for 6 min for AAROM and cardiovascular endurance  - Seated Hamstring w/ rolled towel under ankle; 30" x 3x  - Heel slides with strap and board: 10 second hold, 10x   - Clamshells; 2 x 10 with 5 second hold with Gillespie TB  - Short arc quads with medium bolster: 3 x 10, 5" hold; 5#    MANUAL THERAPY TECHNIQUES including Joint mobilizations and Soft tissue Mobilization were applied to Left knee for 15 minutes.  - Patellar mobs Grade 3-4   - Patellar fat pad mobs/ stretch (pre and post treatment)  - Posterior tibial mobs Grade 2-3   - Passive flexion with patient laying inclined     Patient Education and Home Exercises     Home Exercises Provided and Patient Education Provided     Education " Patient will be able to prepare meal without dizziness   Target Date 01/10/22   Clinical Impression   Criteria for Skilled Therapeutic Interventions Met Yes, treatment indicated   OT Diagnosis dizziness, unsteadiness on feet, decreased ADL   Clinical Decision Making (Complexity) Low complexity   Therapy Frequency every other week   Predicted Duration of Therapy Intervention (days/wks) 12 weeks   Risks and Benefits of Treatment have been explained. Yes   Patient, Family & other staff in agreement with plan of care Yes   Clinical Impression Comments patient does have symptoms consistent with vestibular migraine. She also has poor VOR function and this could be a result of the mild right vestibular weakness. Instructed her on vestibular exercises today. See back in 2 weeks. Patient would benefit from PT for myofascial and craniosacral work to right side of head and neck for pressure and pain. Referral requested.   Education Assessment   Barriers To Learning No Barriers   Total Evaluation Time   OT Nicki Low Complexity Minutes (40642) 20      provided:   - updated HEP to add slump nerve slides    Written Home Exercises Provided: Patient instructed to cont prior HEP. Exercises were reviewed and Bell was able to demonstrate them prior to the end of the session.  Bell demonstrated good  understanding of the education provided. See EMR under Patient Instructions for exercises provided during therapy sessions; Updated 2/21/22    ASSESSMENT   Bell presents 7 wks s/p L TKA. She is continuing to improve in range of motion and functional mobility. Today, however, she repeatedly was limited by pain from infrapatellar fat pad impingment, but this was resolved via manual interventions. She will benefit continued strengthening and progression per protocol.    Bell Is progressing well towards her goals.   Pt prognosis is Good.     Pt will continue to benefit from skilled outpatient physical therapy to address the deficits listed in the problem list box on initial evaluation, provide pt/family education and to maximize pt's level of independence in the home and community environment.     Pt's spiritual, cultural and educational needs considered and pt agreeable to plan of care and goals.     Anticipated barriers to physical therapy: none    Goals:   Short Term Goals:  1. Pt will be independent with HEP supplement PT in improving functional mobility. MET 3/7/22  2. Pt will improve L LE strength to at least 75% of R LE strength as measured via MicroFet handheld dynamometer in order to improve functional mobility (Progressing, not met)  3. Pt will improve L knee AROM to at least 0-110 in order to improve gait MET 3/7/22     Long Term Goals:  1. Pt will be independent with updated HEP supplement PT in improving functional mobility. (MET 3/7/22  2. Pt will improve L LE strength to at least 90% of R LE strength as measured via MicroFet handheld dynamometer in order to improve functional mobility (Progressing, not met)  3. Pt will improve L knee AROM to at least  0-115 in order to improve gait and ability to perform ADLs (Progressing, not met)  4. Pt will improve FOTO knee survey score to </= 27% limited in order to demo improved functional mobility (Progressing, not met)  5. Pt will perform TUG in < 10 seconds without AD in order to demo improved gait speed (Progressing, not met)  6. Pt will perform at least 15 sit to stands without UE support on 30 second sit to stand test in order to demo improved ability to perform transfers (Progressing, not met)    PLAN     Progress per protocol as able without increasing neural symptoms.    Alma Delia Hagen, PT, DPT

## 2022-05-10 NOTE — ADDENDUM NOTE
Encounter addended by: Filiberto Sainz, PT on: 5/10/2022 11:56 AM   Actions taken: Clinical Note Signed

## 2022-05-10 NOTE — PROGRESS NOTES
[facility]    Outpatient Physical Therapy Discharge Note  Patient: George Hill  : 1985    Beginning/End Dates of Reporting Period:  10/19/21 to 21    Referring Provider: Romero Edmonds MD    Therapy Diagnosis: headaches/facial pain     Client Self Report: She did feel better after the last treatment. She did feel better for 10 days or so. Now it is coming and going in the last few weeks. The ringing in her ears is still there and has been continuous.       Goals:  Goal Identifier 1   Goal Description Pt will increase C-rot to >80 deg B to imprvoe overall mobility in 12 weeks.    Target Date 22   Date Met      Progress (detail required for progress note):       Goal Identifier 2   Goal Description Pt will decrease all pain from 0-6/10 in 12 weeks.    Target Date 22   Date Met      Progress (detail required for progress note):       Goal Identifier 3   Goal Description Pt will have no episodes of dizzy/imbalance in 12 weeks.    Target Date 22   Date Met      Progress (detail required for progress note):       Goal Identifier     Goal Description     Target Date     Date Met      Progress (detail required for progress note):       Goal Identifier     Goal Description     Target Date     Date Met      Progress (detail required for progress note):       Goal Identifier     Goal Description     Target Date     Date Met      Progress (detail required for progress note):       Goal Identifier     Goal Description     Target Date     Date Met      Progress (detail required for progress note):       Goal Identifier     Goal Description     Target Date     Date Met      Progress (detail required for progress note):             Plan:  Discharge from therapy.    Discharge:    Reason for Discharge: Patient chooses to discontinue therapy.  Patient has failed to schedule further  appointments.      Discharge Plan: Patient to continue home program.

## 2022-08-08 ENCOUNTER — E-VISIT (OUTPATIENT)
Dept: URGENT CARE | Facility: URGENT CARE | Age: 37
End: 2022-08-08
Payer: COMMERCIAL

## 2022-08-08 DIAGNOSIS — N39.0 ACUTE UTI (URINARY TRACT INFECTION): Primary | ICD-10-CM

## 2022-08-08 PROCEDURE — 99421 OL DIG E/M SVC 5-10 MIN: CPT | Performed by: EMERGENCY MEDICINE

## 2022-08-08 RX ORDER — NITROFURANTOIN 25; 75 MG/1; MG/1
100 CAPSULE ORAL 2 TIMES DAILY
Qty: 10 CAPSULE | Refills: 0 | Status: SHIPPED | OUTPATIENT
Start: 2022-08-08 | End: 2022-08-13

## 2022-08-08 NOTE — PATIENT INSTRUCTIONS
Dear George Hill    After reviewing your responses, I've been able to diagnose you with a urinary tract infection, which is a common infection of the bladder with bacteria.  This is not a sexually transmitted infection, though urinating immediately after intercourse can help prevent infections.  Drinking lots of fluids is also helpful to clear your current infection and prevent the next one.      I have sent a prescription for antibiotics (Macrobid) to your pharmacy to treat this infection. You were given Bactrim prior but I believe Macrobid to be an antibiotic equally effective with less side effects.    It is important that you take all of your prescribed medication even if your symptoms are improving after a few doses.  Taking all of your medicine helps prevent the symptoms from returning.     If your symptoms worsen, you develop pain in your back or stomach, develop fevers, or are not improving in 5 days, please contact your primary care provider for an appointment or visit any of our convenient Walk-in or Urgent Care Centers to be seen, which can be found on our website here.    Thanks again for choosing us as your health care partner,    Fabricio Delarosa PA-C  Dear George Hill,     After reviewing your responses, I would like you to come in for a urine test to make sure we treat you correctly. This urine test is to evaluate you for a possible urinary tract infection, and should be scheduled for today or tomorrow. Schedule a Lab Only appointment here.     Lab appointments are not available at most locations on the weekends. If no Lab Only appointment is available, you should be seen in any of our convenient Walk-in or Urgent Care Centers, which can be found on our website here.     You will receive instructions with your results in [employer] once they are available.     If your symptoms worsen, you develop pain in your back or stomach, develop fevers, or are not improving in 5 days, please contact your  primary care provider for an appointment or visit a Walk-in or Urgent Care Center to be seen.     Thanks again for choosing us as your health care partner,     Farbicio Delarosa PA-C    Urinary Tract Infections in Women  Urinary tract infections (UTIs) are most often caused by bacteria. These bacteria enter the urinary tract. The bacteria may come from inside the body. Or they may travel from the skin outside the rectum or vagina into the urethra. Female anatomy makes it easy for bacteria from the bowel to enter a woman s urinary tract, which is the most common source of UTI. This means women develop UTIs more often than men. Pain in or around the urinary tract is a common UTI symptom. But the only way to know for sure if you have a UTI for the healthcare provider to test your urine. The two tests that may be done are the urinalysis and urine culture.     Types of UTIs    Cystitis. A bladder infection (cystitis) is the most common UTI in women. You may have urgent or frequent need to pee. You may also have pain, burning when you pee, and bloody urine.    Urethritis. This is an inflamed urethra, which is the tube that carries urine from the bladder to outside the body. You may have lower stomach or back pain. You may also have urgent or frequent need to pee.    Pyelonephritis. This is a kidney infection. If not treated, it can be serious and damage your kidneys. In severe cases, you may need to stay in the hospital. You may have a fever and lower back pain.    Medicines to treat a UTI  Most UTIs are treated with antibiotics. These kill the bacteria. The length of time you need to take them depends on the type of infection. It may be as short as 3 days. If you have repeated UTIs, you may need a low-dose antibiotic for several months. Take antibiotics exactly as directed. Don t stop taking them until all of the medicine is gone. If you stop taking the antibiotic too soon, the infection may not go away. You may also  develop a resistance to the antibiotic. This can make it much harder to treat.   Lifestyle changes to treat and prevent UTIs   The lifestyle changes below will help get rid of your UTI. They may also help prevent future UTIs.     Drink plenty of fluids. This includes water, juice, or other caffeine-free drinks. Fluids help flush bacteria out of your body.    Empty your bladder. Always empty your bladder when you feel the urge to pee. And always pee before going to sleep. Urine that stays in your bladder can lead to infection. Try to pee before and after sex as well.    Practice good personal hygiene. Wipe yourself from front to back after using the toilet. This helps keep bacteria from getting into the urethra.    Use condoms during sex. These help prevent UTIs caused by sexually transmitted bacteria. Also don't use spermicides during sex. These can increase the risk for UTIs. Choose other forms of birth control instead. For women who tend to get UTIs after sex, a low-dose of a preventive antibiotic may be used. Be sure to discuss this option with your healthcare provider.    Follow up with your healthcare provider as directed. He or she may test to make sure the infection has cleared. If needed, more treatment may be started.  [employer] last reviewed this educational content on 7/1/2019 2000-2021 [employer]. All rights reserved. This information is not intended as a substitute for professional medical care. Always follow your healthcare professional's instructions.

## 2022-08-09 ENCOUNTER — LAB (OUTPATIENT)
Dept: LAB | Facility: CLINIC | Age: 37
End: 2022-08-09
Payer: COMMERCIAL

## 2022-08-09 DIAGNOSIS — N39.0 ACUTE UTI (URINARY TRACT INFECTION): ICD-10-CM

## 2022-08-09 LAB
ALBUMIN UR-MCNC: NEGATIVE MG/DL
APPEARANCE UR: CLEAR
BACTERIA #/AREA URNS HPF: ABNORMAL /HPF
BILIRUB UR QL STRIP: NEGATIVE
COLOR UR AUTO: YELLOW
GLUCOSE UR STRIP-MCNC: NEGATIVE MG/DL
HGB UR QL STRIP: ABNORMAL
KETONES UR STRIP-MCNC: NEGATIVE MG/DL
LEUKOCYTE ESTERASE UR QL STRIP: ABNORMAL
NITRATE UR QL: NEGATIVE
PH UR STRIP: 7 [PH] (ref 5–8)
RBC #/AREA URNS AUTO: ABNORMAL /HPF
SP GR UR STRIP: 1.01 (ref 1–1.03)
SQUAMOUS #/AREA URNS AUTO: ABNORMAL /LPF
UROBILINOGEN UR STRIP-ACNC: 0.2 E.U./DL
WBC #/AREA URNS AUTO: ABNORMAL /HPF

## 2022-08-09 PROCEDURE — 81001 URINALYSIS AUTO W/SCOPE: CPT

## 2022-10-01 ENCOUNTER — HEALTH MAINTENANCE LETTER (OUTPATIENT)
Age: 37
End: 2022-10-01

## 2022-10-12 NOTE — ADDENDUM NOTE
Encounter addended by: Filiberto Sainz, PT on: 10/12/2022 7:24 AM   Actions taken: Episode resolved, Clinical Note Signed

## 2022-10-12 NOTE — PROGRESS NOTES
[facility]    Outpatient Physical Therapy Discharge Note  Patient: George Hill  : 1985    Beginning/End Dates of Reporting Period:  10/19/22 to 22    Referring Provider: Dr. Ibarra    Therapy Diagnosis: head pain     Client Self Report: She did feel better after the last treatment. She did feel better for 10 days or so. Now it is coming and going in the last few weeks. The ringing in her ears is still there and has been continuous.     Objective Measurements:  Objective Measure: Neural, art fascial tensions.                                        Outcome Measures (most recent score):      Goals:  Goal Identifier 1   Goal Description Pt will increase C-rot to >80 deg B to imprvoe overall mobility in 12 weeks.    Target Date 22   Date Met      Progress (detail required for progress note):       Goal Identifier 2   Goal Description Pt will decrease all pain from 0-6/10 in 12 weeks.    Target Date 22   Date Met      Progress (detail required for progress note):       Goal Identifier 3   Goal Description Pt will have no episodes of dizzy/imbalance in 12 weeks.    Target Date 22   Date Met      Progress (detail required for progress note):       Goal Identifier     Goal Description     Target Date     Date Met      Progress (detail required for progress note):       Goal Identifier     Goal Description     Target Date     Date Met      Progress (detail required for progress note):       Goal Identifier     Goal Description     Target Date     Date Met      Progress (detail required for progress note):       Goal Identifier     Goal Description     Target Date     Date Met      Progress (detail required for progress note):       Goal Identifier     Goal Description     Target Date     Date Met      Progress (detail required for progress note):             Plan:  Discharge from  therapy.    Discharge:    Reason for Discharge: Patient has failed to schedule further appointments.    Equipment Issued:     Discharge Plan: Patient to continue home program.

## 2022-11-11 ENCOUNTER — OFFICE VISIT (OUTPATIENT)
Dept: INTERNAL MEDICINE | Facility: CLINIC | Age: 37
End: 2022-11-11
Payer: COMMERCIAL

## 2022-11-11 VITALS
WEIGHT: 155 LBS | OXYGEN SATURATION: 100 % | DIASTOLIC BLOOD PRESSURE: 63 MMHG | TEMPERATURE: 98.7 F | BODY MASS INDEX: 23.49 KG/M2 | RESPIRATION RATE: 14 BRPM | HEART RATE: 78 BPM | HEIGHT: 68 IN | SYSTOLIC BLOOD PRESSURE: 107 MMHG

## 2022-11-11 DIAGNOSIS — G43.809 VESTIBULAR MIGRAINE: Primary | ICD-10-CM

## 2022-11-11 DIAGNOSIS — M62.81 GENERALIZED MUSCLE WEAKNESS: ICD-10-CM

## 2022-11-11 DIAGNOSIS — E55.9 VITAMIN D DEFICIENCY: ICD-10-CM

## 2022-11-11 DIAGNOSIS — R53.83 FATIGUE, UNSPECIFIED TYPE: ICD-10-CM

## 2022-11-11 LAB
ALBUMIN SERPL BCG-MCNC: 4.4 G/DL (ref 3.5–5.2)
ALP SERPL-CCNC: 64 U/L (ref 35–104)
ALT SERPL W P-5'-P-CCNC: 16 U/L (ref 10–35)
ANION GAP SERPL CALCULATED.3IONS-SCNC: 13 MMOL/L (ref 7–15)
AST SERPL W P-5'-P-CCNC: 23 U/L (ref 10–35)
BASOPHILS # BLD AUTO: 0 10E3/UL (ref 0–0.2)
BASOPHILS NFR BLD AUTO: 0 %
BILIRUB SERPL-MCNC: 0.2 MG/DL
BUN SERPL-MCNC: 8.5 MG/DL (ref 6–20)
CALCIUM SERPL-MCNC: 9.3 MG/DL (ref 8.6–10)
CHLORIDE SERPL-SCNC: 102 MMOL/L (ref 98–107)
CREAT SERPL-MCNC: 0.69 MG/DL (ref 0.51–0.95)
DEPRECATED HCO3 PLAS-SCNC: 24 MMOL/L (ref 22–29)
EOSINOPHIL # BLD AUTO: 0.1 10E3/UL (ref 0–0.7)
EOSINOPHIL NFR BLD AUTO: 2 %
ERYTHROCYTE [DISTWIDTH] IN BLOOD BY AUTOMATED COUNT: 12.3 % (ref 10–15)
GFR SERPL CREATININE-BSD FRML MDRD: >90 ML/MIN/1.73M2
GLUCOSE SERPL-MCNC: 94 MG/DL (ref 70–99)
HCT VFR BLD AUTO: 39.1 % (ref 35–47)
HGB BLD-MCNC: 12.4 G/DL (ref 11.7–15.7)
IMM GRANULOCYTES # BLD: 0 10E3/UL
IMM GRANULOCYTES NFR BLD: 0 %
IRON BINDING CAPACITY (ROCHE): 377 UG/DL (ref 240–430)
IRON SATN MFR SERPL: 13 % (ref 15–46)
IRON SERPL-MCNC: 49 UG/DL (ref 37–145)
LYMPHOCYTES # BLD AUTO: 1.8 10E3/UL (ref 0.8–5.3)
LYMPHOCYTES NFR BLD AUTO: 30 %
MCH RBC QN AUTO: 25.9 PG (ref 26.5–33)
MCHC RBC AUTO-ENTMCNC: 31.7 G/DL (ref 31.5–36.5)
MCV RBC AUTO: 82 FL (ref 78–100)
MONOCYTES # BLD AUTO: 0.4 10E3/UL (ref 0–1.3)
MONOCYTES NFR BLD AUTO: 7 %
NEUTROPHILS # BLD AUTO: 3.6 10E3/UL (ref 1.6–8.3)
NEUTROPHILS NFR BLD AUTO: 60 %
PLATELET # BLD AUTO: 312 10E3/UL (ref 150–450)
POTASSIUM SERPL-SCNC: 3.9 MMOL/L (ref 3.4–5.3)
PROT SERPL-MCNC: 7.5 G/DL (ref 6.4–8.3)
RBC # BLD AUTO: 4.78 10E6/UL (ref 3.8–5.2)
SODIUM SERPL-SCNC: 139 MMOL/L (ref 136–145)
TSH SERPL DL<=0.005 MIU/L-ACNC: 1.25 UIU/ML (ref 0.3–4.2)
WBC # BLD AUTO: 6 10E3/UL (ref 4–11)

## 2022-11-11 PROCEDURE — 36415 COLL VENOUS BLD VENIPUNCTURE: CPT | Performed by: INTERNAL MEDICINE

## 2022-11-11 PROCEDURE — 80050 GENERAL HEALTH PANEL: CPT | Performed by: INTERNAL MEDICINE

## 2022-11-11 PROCEDURE — 82306 VITAMIN D 25 HYDROXY: CPT | Performed by: INTERNAL MEDICINE

## 2022-11-11 PROCEDURE — 83540 ASSAY OF IRON: CPT | Performed by: INTERNAL MEDICINE

## 2022-11-11 PROCEDURE — 99395 PREV VISIT EST AGE 18-39: CPT | Performed by: INTERNAL MEDICINE

## 2022-11-11 PROCEDURE — 83550 IRON BINDING TEST: CPT | Performed by: INTERNAL MEDICINE

## 2022-11-11 PROCEDURE — 99214 OFFICE O/P EST MOD 30 MIN: CPT | Mod: 25 | Performed by: INTERNAL MEDICINE

## 2022-11-11 RX ORDER — RIZATRIPTAN BENZOATE 5 MG/1
5 TABLET ORAL
Qty: 20 TABLET | Refills: 1 | Status: SHIPPED | OUTPATIENT
Start: 2022-11-11 | End: 2023-01-19

## 2022-11-11 RX ORDER — MECLIZINE HYDROCHLORIDE 25 MG/1
TABLET ORAL
Qty: 30 TABLET | Refills: 3 | Status: SHIPPED | OUTPATIENT
Start: 2022-11-11 | End: 2023-01-19

## 2022-11-11 ASSESSMENT — ENCOUNTER SYMPTOMS
WEAKNESS: 1
BREAST MASS: 0
HEADACHES: 1

## 2022-11-11 NOTE — PATIENT INSTRUCTIONS
For vestibular Migraine:  Can try Meclizine at bed time. Use 2 pillows at night.   Rizatriptan to help with headache. Do not use for more then 10 days a months maximum.    2. Prophylactically , can use Topamax medication or antidepressant.

## 2022-11-11 NOTE — PROGRESS NOTES
SUBJECTIVE:   CC: Nicola is an 37 year old who presents for preventive health visit.     Nicola is a 37-year-old female with history of migraines, anxiety, iron deficiency and vitamin D deficiency.  Initially she is scheduled this is a physical however when we started talking it seemed to be more of a problem oriented visit.    She comes in with several complaints including increased weakness, fatigue, new onset of headaches for the last 10 days and balance problems.    She has had problems balance in the past and saw ENT and neurology.  Cervical MRI, brain MRI and CTA of the neck were all normal.  Neurologist felt that may be anxiety is contributing to this vestibular symptoms however back in [place] she was also diagnosed with vestibular migraine.  She has never had headaches until recently associated with this.    Currently she is experiencing mild dizziness when she is sitting working or even trying to fall asleep.  She still able to work but it is difficult for her to concentrate.  Headache started 10 days ago.  She describes tension sensation in her neck spreading towards the front and more around her right eye.  It does tend to be 7 out of 10 in intensity.  She took Tylenol on and off for several days which has not helped.  Currently she is not on any medication.    She is  and has 2 children 9 and 4-year-old.  She works as an  and does spend a lot of time in front of the computer.  She denies smoking or alcohol use.    No family history of vestibular migraines.    {Healthy Habits:     Getting at least 3 servings of Calcium per day:  Yes    Bi-annual eye exam:  Yes    Dental care twice a year:  Yes    Sleep apnea or symptoms of sleep apnea:  None    Diet:  Regular (no restrictions)    Frequency of exercise:  None    Taking medications regularly:  Yes    Medication side effects:  None    PHQ-2 Total Score: 0    Additional concerns today:  No    Today's PHQ-2 Score:   PHQ-2 ( 1999 [employer]) 11/11/2022    Q1: Little interest or pleasure in doing things 0   Q2: Feeling down, depressed or hopeless 0   PHQ-2 Score 0   PHQ-2 Total Score (12-17 Years)- Positive if 3 or more points; Administer PHQ-A if positive -   Q1: Little interest or pleasure in doing things Not at all   Q2: Feeling down, depressed or hopeless Not at all   PHQ-2 Score 0       Abuse: Current or Past (Physical, Sexual or Emotional) - No  Do you feel safe in your environment? Yes    Have you ever done Advance Care Planning? (For example, a Health Directive, POLST, or a discussion with a medical provider or your loved ones about your wishes): No, advance care planning information given to patient to review.  Patient declined advance care planning discussion at this time.    Social History     Tobacco Use     Smoking status: Never     Smokeless tobacco: Never   Substance Use Topics     Alcohol use: No       Alcohol Use 11/11/2022   Prescreen: >3 drinks/day or >7 drinks/week? No   Prescreen: >3 drinks/day or >7 drinks/week? -     FHS-7:   Breast CA Risk Assessment (FHS-7) 11/11/2022   Did any of your first-degree relatives have breast or ovarian cancer? No   Did any of your relatives have bilateral breast cancer? No   Did any man in your family have breast cancer? No   Did any woman in your family have breast and ovarian cancer? No   Did any woman in your family have breast cancer before age 50 y? No   Do you have 2 or more relatives with breast and/or ovarian cancer? No   Do you have 2 or more relatives with breast and/or bowel cancer? No     {I  PAP / HPV Latest Ref Rng & Units 8/20/2020   PAP Negative for squamous intraepithelial lesion or malignancy. Negative for squamous intraepithelial lesion or malignancy  Electronically signed by Mayi Avelar CT (ASCP) on 9/2/2020 at 11:23 AM     HPV16 NEG Negative   HPV18 NEG Negative   HRHPV NEG Negative     Reviewed and updated as needed this visit by clinical staff   Tobacco  Allergies  Meds           "    Reviewed and updated as needed this visit by Provider                  Review of Systems   Breasts:  Negative for tenderness, breast mass and discharge.   Genitourinary: Negative for pelvic pain, vaginal bleeding and vaginal discharge.   Neurological: Positive for weakness and headaches.        OBJECTIVE:   /63 (BP Location: Left arm, Patient Position: Sitting, Cuff Size: Adult Regular)   Pulse 78   Temp 98.7  F (37.1  C) (Tympanic)   Resp 14   Ht 1.727 m (5' 8\")   Wt 70.3 kg (155 lb)   SpO2 100%   BMI 23.57 kg/m    Physical Exam  General: well appearing young female, alert and oriented x3  EYES: Eyelids, conjunctiva, and sclera were normal. Pupils were normal.   HEAD, EARS, NOSE, MOUTH, AND THROAT: no cervical LAD, no thyromegaly or nodules appreciated. TMs are visualized and normal, oropharynx is clear.  RESPIRATORY: respirations non labored, CTA bl, no wheezes, rales, no forced expiratory wheezing.  CARDIOVASCULAR: Heart rate and rhythm were normal. No murmurs, rubs,gallops. There was no peripheral edema.   GASTROINTESTINAL: Positive bowel sounds, abdomen is soft, non tender, non distended.     MUSCULOSKELETAL: Muscle mass was normal for age. No joint synovitis or deformity.  LYMPHATIC: There were no enlarged nodes palpable.  SKIN/HAIR/NAILS: Skin color was normal.  No rashes.  NEUROLOGIC: The patient was alert and oriented.  Speech was normal.  There is no facial asymmetry.  No nystagmus noted.  PSYCHIATRIC:  Mood and affect were normal.       ASSESSMENT/PLAN:   George was seen today for physical and recheck medication.    Diagnoses and all orders for this visit:    Vestibular migraine  She has had vestibular symptoms before but migraine and headache is new for the last 10 days.  Symptoms are moderate but not severe.  Previously she has had cervical brain MRI and neck CTA which were all normal.  Currently discussed a trial of meclizine at bedtime and rizatriptan as needed for " "headache.    Long-term discussed prophylactic medications.  Currently her blood pressure is on the lower side so I do not think she will tolerate a beta-blocker.  She is not interested in antidepressants and denies depression or anxiety.  Discussed possibility of trying Topamax.  If symptoms persist and headache is not better she should follow-up with neurology whom she saw earlier this year.  -     rizatriptan (MAXALT) 5 MG tablet; Take 1 tablet (5 mg) by mouth at onset of headache for migraine May repeat in 2 hours. Max 6 tablets/24 hours.  -     meclizine (ANTIVERT) 25 MG tablet; One tab by mouth at bed time    Generalized muscle weakness and fatigue  We will check general lab work.  -     TSH with free T4 reflex  -     CBC with platelets and differential  -     Iron and iron binding capacity  -     Comprehensive metabolic panel    Vitamin D deficiency  -     Vitamin D Deficiency      Estimated body mass index is 23.57 kg/m  as calculated from the following:    Height as of this encounter: 1.727 m (5' 8\").    Weight as of this encounter: 70.3 kg (155 lb).      She reports that she has never smoked. She has never used smokeless tobacco.      Alicia Haines MD  [facility]  "

## 2022-11-14 LAB — DEPRECATED CALCIDIOL+CALCIFEROL SERPL-MC: 28 UG/L (ref 20–75)

## 2023-01-19 ENCOUNTER — OFFICE VISIT (OUTPATIENT)
Dept: FAMILY MEDICINE | Facility: CLINIC | Age: 38
End: 2023-01-19
Payer: COMMERCIAL

## 2023-01-19 ENCOUNTER — VIRTUAL VISIT (OUTPATIENT)
Dept: FAMILY MEDICINE | Facility: CLINIC | Age: 38
End: 2023-01-19
Payer: COMMERCIAL

## 2023-01-19 VITALS
RESPIRATION RATE: 14 BRPM | OXYGEN SATURATION: 99 % | HEART RATE: 75 BPM | WEIGHT: 162 LBS | DIASTOLIC BLOOD PRESSURE: 80 MMHG | BODY MASS INDEX: 24.63 KG/M2 | SYSTOLIC BLOOD PRESSURE: 121 MMHG | TEMPERATURE: 98.1 F

## 2023-01-19 DIAGNOSIS — L08.9 INFECTED SEBACEOUS CYST OF SKIN: Primary | ICD-10-CM

## 2023-01-19 DIAGNOSIS — L08.9 INFECTED SKIN LESION: ICD-10-CM

## 2023-01-19 DIAGNOSIS — L02.212 ABSCESS OF BACK: ICD-10-CM

## 2023-01-19 DIAGNOSIS — L72.3 INFECTED SEBACEOUS CYST OF SKIN: Primary | ICD-10-CM

## 2023-01-19 DIAGNOSIS — L72.3 SEBACEOUS CYST: Primary | ICD-10-CM

## 2023-01-19 PROCEDURE — 10060 I&D ABSCESS SIMPLE/SINGLE: CPT | Performed by: FAMILY MEDICINE

## 2023-01-19 PROCEDURE — 99213 OFFICE O/P EST LOW 20 MIN: CPT | Mod: 95 | Performed by: FAMILY MEDICINE

## 2023-01-19 RX ORDER — SULFAMETHOXAZOLE/TRIMETHOPRIM 800-160 MG
1 TABLET ORAL 2 TIMES DAILY
Qty: 20 TABLET | Refills: 0 | Status: SHIPPED | OUTPATIENT
Start: 2023-01-19 | End: 2023-01-29

## 2023-01-19 ASSESSMENT — PATIENT HEALTH QUESTIONNAIRE - PHQ9
SUM OF ALL RESPONSES TO PHQ QUESTIONS 1-9: 0
SUM OF ALL RESPONSES TO PHQ QUESTIONS 1-9: 0
10. IF YOU CHECKED OFF ANY PROBLEMS, HOW DIFFICULT HAVE THESE PROBLEMS MADE IT FOR YOU TO DO YOUR WORK, TAKE CARE OF THINGS AT HOME, OR GET ALONG WITH OTHER PEOPLE: NOT DIFFICULT AT ALL

## 2023-01-19 NOTE — PROGRESS NOTES
ASSESSMENT:  Sebaceous cyst with abscess formation on the upper back    PLAN:  After informed consent was obtained, using Betadine for cleansing   and 1% Lidocaine  with epinephrine for anesthetic, with sterile   technique, an incision was made into the abscess cavity which was   then drained of purulent material.  Cavity explored and loculations broken down.  No dressing required.  Procedure well   tolerated.  Dressing applied and wound care instructions provided.  No surrounding cellulitis or indication for antibiotics.   Return to clinic prn for pain, increased   swelling or fever.    SUBJECTIVE:  37 year old female presents with abscess formation on her upper back for the past 15 days.  She has had a sebaceous cyst present for the past 12 to 15 years without problems but it recently became larger and more painful.  No   fever or chills.  No history of diabetes.    OBJECTIVE:  Fluctuant abscess noted on the the upper back.  A size: 2 cm.  There is   Animal surrounding induration and erythema.  It is mildly tender.. Afebrile.

## 2023-01-19 NOTE — PROGRESS NOTES
Nicola is a 37 year old who is being evaluated via a billable video visit.      How would you like to obtain your AVS? MyChart  If the video visit is dropped, the invitation should be resent by: Send to e-mail at: [email or web address]  Will anyone else be joining your video visit? No        Assessment & Plan     Sebaceous cyst  Warm compress to affected area three times daily for 5-7 days  Close follow up at the clinic with possible I&D and pathology as pigmented lesion has been present for years.  - sulfamethoxazole-trimethoprim (BACTRIM DS) 800-160 MG tablet; Take 1 tablet by mouth 2 times daily for 10 days  - PRIMARY CARE FOLLOW-UP SCHEDULING; Future    Infected skin lesion  - sulfamethoxazole-trimethoprim (BACTRIM DS) 800-160 MG tablet; Take 1 tablet by mouth 2 times daily for 10 days  - PRIMARY CARE FOLLOW-UP SCHEDULING; Future    Abscess of back  - sulfamethoxazole-trimethoprim (BACTRIM DS) 800-160 MG tablet; Take 1 tablet by mouth 2 times daily for 10 days  - PRIMARY CARE FOLLOW-UP SCHEDULING; Future        No follow-ups on file.   Follow-up Visit   Expected date:  Jan 26, 2023 (Approximate)      Follow Up Appointment Details:     Follow-up with whom?: PCP    Follow-Up for what?: Acute Issue Recheck    Additional Details: incision drainage left cyst back    How?: In Person                    Shoaib Wells MD  [facility] NAVA Petersen is a 37 year old accompanied by her spouse, presenting for the following health issues:  Mass (Black head/white head that is swollen, red, irritated, itchy)       Skin lesion to left upper back proximal to mid back/spine for 2-3 days. A small 'pigmented' lesion has been present for >10 years but swelling, erythema without drainage is now present. She denies fever, chills, new soaps or detergents and is up to date on her tetanus vaccine.        Review of Systems   Constitutional, HEENT, cardiovascular, pulmonary, GI, , musculoskeletal, neuro,  skin, endocrine and psych systems are negative, except as otherwise noted.      Objective           Vitals:  No vitals were obtained today due to virtual visit.    Physical Exam   GENERAL: Healthy, alert and no distress  EYES: Eyes grossly normal to inspection.  No discharge or erythema, or obvious scleral/conjunctival abnormalities.  RESP: No audible wheeze, cough, or visible cyanosis.  No visible retractions or increased work of breathing.    SKIN: approximately 1 cm circular erythematous nodule with pus content and ?fluntuant mass is present left upper back close to spine area( Skin).  NEURO: Cranial nerves grossly intact.  Mentation and speech appropriate for age.  PSYCH: Mentation appears normal, affect normal/bright, judgement and insight intact, normal speech and appearance well-groomed.          Video-Visit Details    Type of service:  Video Visit     Originating Location (pt. Location): Home    Distant Location (provider location):  Off-site  Platform used for Video Visit: [employer]

## 2023-02-04 ENCOUNTER — HEALTH MAINTENANCE LETTER (OUTPATIENT)
Age: 38
End: 2023-02-04

## 2024-02-09 ENCOUNTER — APPOINTMENT (OUTPATIENT)
Dept: ULTRASOUND IMAGING | Facility: CLINIC | Age: 39
End: 2024-02-09
Attending: STUDENT IN AN ORGANIZED HEALTH CARE EDUCATION/TRAINING PROGRAM
Payer: COMMERCIAL

## 2024-02-09 ENCOUNTER — HOSPITAL ENCOUNTER (EMERGENCY)
Facility: CLINIC | Age: 39
Discharge: HOME | End: 2024-02-09
Attending: STUDENT IN AN ORGANIZED HEALTH CARE EDUCATION/TRAINING PROGRAM | Admitting: STUDENT IN AN ORGANIZED HEALTH CARE EDUCATION/TRAINING PROGRAM
Payer: COMMERCIAL

## 2024-02-09 ENCOUNTER — APPOINTMENT (OUTPATIENT)
Dept: CT IMAGING | Facility: CLINIC | Age: 39
End: 2024-02-09
Attending: STUDENT IN AN ORGANIZED HEALTH CARE EDUCATION/TRAINING PROGRAM
Payer: COMMERCIAL

## 2024-02-09 VITALS
HEART RATE: 69 BPM | HEIGHT: 67 IN | DIASTOLIC BLOOD PRESSURE: 59 MMHG | WEIGHT: 180 LBS | RESPIRATION RATE: 16 BRPM | TEMPERATURE: 98 F | BODY MASS INDEX: 28.25 KG/M2 | OXYGEN SATURATION: 97 % | SYSTOLIC BLOOD PRESSURE: 104 MMHG

## 2024-02-09 DIAGNOSIS — R10.30 LOWER ABDOMINAL PAIN: ICD-10-CM

## 2024-02-09 LAB
ALBUMIN SERPL BCG-MCNC: 4.5 G/DL (ref 3.5–5.2)
ALBUMIN UR-MCNC: 10 MG/DL
ALP SERPL-CCNC: 79 U/L (ref 40–150)
ALT SERPL W P-5'-P-CCNC: 12 U/L (ref 0–50)
ANION GAP SERPL CALCULATED.3IONS-SCNC: 9 MMOL/L (ref 7–15)
APPEARANCE UR: CLEAR
AST SERPL W P-5'-P-CCNC: 18 U/L (ref 0–45)
BASOPHILS # BLD AUTO: 0 10E3/UL (ref 0–0.2)
BASOPHILS NFR BLD AUTO: 0 %
BILIRUB DIRECT SERPL-MCNC: <0.2 MG/DL (ref 0–0.3)
BILIRUB SERPL-MCNC: 0.3 MG/DL
BILIRUB UR QL STRIP: NEGATIVE
BUN SERPL-MCNC: 7.4 MG/DL (ref 6–20)
CALCIUM SERPL-MCNC: 9.9 MG/DL (ref 8.6–10)
CHLORIDE SERPL-SCNC: 103 MMOL/L (ref 98–107)
COLOR UR AUTO: ABNORMAL
CREAT SERPL-MCNC: 0.66 MG/DL (ref 0.51–0.95)
DEPRECATED HCO3 PLAS-SCNC: 28 MMOL/L (ref 22–29)
EGFRCR SERPLBLD CKD-EPI 2021: >90 ML/MIN/1.73M2
EOSINOPHIL # BLD AUTO: 0.1 10E3/UL (ref 0–0.7)
EOSINOPHIL NFR BLD AUTO: 1 %
ERYTHROCYTE [DISTWIDTH] IN BLOOD BY AUTOMATED COUNT: 12.9 % (ref 10–15)
GLUCOSE SERPL-MCNC: 111 MG/DL (ref 70–99)
GLUCOSE UR STRIP-MCNC: NEGATIVE MG/DL
HCG UR QL: NEGATIVE
HCT VFR BLD AUTO: 38.9 % (ref 35–47)
HGB BLD-MCNC: 12.7 G/DL (ref 11.7–15.7)
HGB UR QL STRIP: ABNORMAL
IMM GRANULOCYTES # BLD: 0 10E3/UL
IMM GRANULOCYTES NFR BLD: 0 %
KETONES UR STRIP-MCNC: NEGATIVE MG/DL
LEUKOCYTE ESTERASE UR QL STRIP: NEGATIVE
LIPASE SERPL-CCNC: 22 U/L (ref 13–60)
LYMPHOCYTES # BLD AUTO: 1.6 10E3/UL (ref 0.8–5.3)
LYMPHOCYTES NFR BLD AUTO: 15 %
MCH RBC QN AUTO: 26 PG (ref 26.5–33)
MCHC RBC AUTO-ENTMCNC: 32.6 G/DL (ref 31.5–36.5)
MCV RBC AUTO: 80 FL (ref 78–100)
MONOCYTES # BLD AUTO: 0.6 10E3/UL (ref 0–1.3)
MONOCYTES NFR BLD AUTO: 6 %
MUCOUS THREADS #/AREA URNS LPF: PRESENT /LPF
NEUTROPHILS # BLD AUTO: 8.2 10E3/UL (ref 1.6–8.3)
NEUTROPHILS NFR BLD AUTO: 78 %
NITRATE UR QL: NEGATIVE
NRBC # BLD AUTO: 0 10E3/UL
NRBC BLD AUTO-RTO: 0 /100
PH UR STRIP: 7 [PH] (ref 5–7)
PLATELET # BLD AUTO: 319 10E3/UL (ref 150–450)
POTASSIUM SERPL-SCNC: 3.7 MMOL/L (ref 3.4–5.3)
PROT SERPL-MCNC: 7.7 G/DL (ref 6.4–8.3)
RBC # BLD AUTO: 4.89 10E6/UL (ref 3.8–5.2)
RBC URINE: 8 /HPF
SODIUM SERPL-SCNC: 140 MMOL/L (ref 135–145)
SP GR UR STRIP: 1.02 (ref 1–1.03)
SQUAMOUS EPITHELIAL: 1 /HPF
UROBILINOGEN UR STRIP-MCNC: <2 MG/DL
WBC # BLD AUTO: 10.5 10E3/UL (ref 4–11)
WBC URINE: 1 /HPF

## 2024-02-09 PROCEDURE — 82374 ASSAY BLOOD CARBON DIOXIDE: CPT | Performed by: STUDENT IN AN ORGANIZED HEALTH CARE EDUCATION/TRAINING PROGRAM

## 2024-02-09 PROCEDURE — 76830 TRANSVAGINAL US NON-OB: CPT

## 2024-02-09 PROCEDURE — 76856 US EXAM PELVIC COMPLETE: CPT

## 2024-02-09 PROCEDURE — 250N000011 HC RX IP 250 OP 636: Performed by: STUDENT IN AN ORGANIZED HEALTH CARE EDUCATION/TRAINING PROGRAM

## 2024-02-09 PROCEDURE — 36415 COLL VENOUS BLD VENIPUNCTURE: CPT | Performed by: STUDENT IN AN ORGANIZED HEALTH CARE EDUCATION/TRAINING PROGRAM

## 2024-02-09 PROCEDURE — 83690 ASSAY OF LIPASE: CPT | Performed by: STUDENT IN AN ORGANIZED HEALTH CARE EDUCATION/TRAINING PROGRAM

## 2024-02-09 PROCEDURE — 85025 COMPLETE CBC W/AUTO DIFF WBC: CPT | Performed by: STUDENT IN AN ORGANIZED HEALTH CARE EDUCATION/TRAINING PROGRAM

## 2024-02-09 PROCEDURE — 96374 THER/PROPH/DIAG INJ IV PUSH: CPT | Mod: 59

## 2024-02-09 PROCEDURE — 96376 TX/PRO/DX INJ SAME DRUG ADON: CPT

## 2024-02-09 PROCEDURE — 81025 URINE PREGNANCY TEST: CPT | Performed by: STUDENT IN AN ORGANIZED HEALTH CARE EDUCATION/TRAINING PROGRAM

## 2024-02-09 PROCEDURE — 99285 EMERGENCY DEPT VISIT HI MDM: CPT | Mod: 25

## 2024-02-09 PROCEDURE — 82248 BILIRUBIN DIRECT: CPT | Performed by: STUDENT IN AN ORGANIZED HEALTH CARE EDUCATION/TRAINING PROGRAM

## 2024-02-09 PROCEDURE — 81001 URINALYSIS AUTO W/SCOPE: CPT | Performed by: STUDENT IN AN ORGANIZED HEALTH CARE EDUCATION/TRAINING PROGRAM

## 2024-02-09 PROCEDURE — 74177 CT ABD & PELVIS W/CONTRAST: CPT

## 2024-02-09 RX ORDER — IOPAMIDOL 755 MG/ML
90 INJECTION, SOLUTION INTRAVASCULAR ONCE
Status: COMPLETED | OUTPATIENT
Start: 2024-02-09 | End: 2024-02-09

## 2024-02-09 RX ORDER — HYDROMORPHONE HYDROCHLORIDE 1 MG/ML
0.5 INJECTION, SOLUTION INTRAMUSCULAR; INTRAVENOUS; SUBCUTANEOUS
Status: COMPLETED | OUTPATIENT
Start: 2024-02-09 | End: 2024-02-09

## 2024-02-09 RX ADMIN — IOPAMIDOL 90 ML: 755 INJECTION, SOLUTION INTRAVENOUS at 14:34

## 2024-02-09 RX ADMIN — HYDROMORPHONE HYDROCHLORIDE 0.5 MG: 1 INJECTION, SOLUTION INTRAMUSCULAR; INTRAVENOUS; SUBCUTANEOUS at 17:35

## 2024-02-09 RX ADMIN — HYDROMORPHONE HYDROCHLORIDE 0.5 MG: 1 INJECTION, SOLUTION INTRAMUSCULAR; INTRAVENOUS; SUBCUTANEOUS at 14:08

## 2024-02-09 RX ADMIN — HYDROMORPHONE HYDROCHLORIDE 0.5 MG: 1 INJECTION, SOLUTION INTRAMUSCULAR; INTRAVENOUS; SUBCUTANEOUS at 13:28

## 2024-02-09 ASSESSMENT — ACTIVITIES OF DAILY LIVING (ADL)
ADLS_ACUITY_SCORE: 35
ADLS_ACUITY_SCORE: 35
ADLS_ACUITY_SCORE: 33
ADLS_ACUITY_SCORE: 35

## 2024-02-09 NOTE — ED PROVIDER NOTES
EMERGENCY DEPARTMENT ENCOUNTER      NAME: George Hill  AGE: 38 year old female  YOB: 1985  MRN: [id number]  EVALUATION DATE & TIME: No admission date for patient encounter.    PCP: [facility]    ED PROVIDER: Daniel Domingo MD      Chief Complaint   Patient presents with    Abdominal Pain    Back Pain         FINAL IMPRESSION:  1. Lower abdominal pain          ED COURSE & MEDICAL DECISION MAKING:    Pertinent Labs & Imaging studies reviewed. (See chart for details)  38 year old female presents to the Emergency Department for evaluation of abd pain    ED Course as of 02/09/24 1854 Fri Feb 09, 2024   1314 Patient is a 38-year-old female with a past medical history significant for anxiety, vestibular migraine, who presents to the emergency department with 2 days of gradual onset constant midline lower abdominal pain radiating to her back without midline back tenderness.  She has no urinary symptoms.  She is currently having vaginal bleeding, which she suspects is her menses.  No fevers.  No black or bloody stools.  No constipation or diarrhea.  She has midline suprapubic tenderness with guarding, no rebound.  No vomiting or nausea associated.  Differential diagnosis includes UTI, appendicitis, symptomatic fibroids.  Also considering ovarian torsion, however it is not unilateral.  Will also exclude ectopic pregnancy, however the patient does have a history of tubal ligation.  Since the index of suspicion is higher of appendicitis, will start with CT abdomen and pelvis.   1435 hCG negative.  No transaminitis or pancreatitis.  No electrolyte abnormalities or kidney injury.  No leukocytosis or anemia.   1435 Pending CT.   1514 CT shows a 20 mm hypodensity in the endocervical canal, so will obtain pelvic ultrasound at this time.   1851 Ultrasound shows likely blood products as the etiology that was found on CT.  Recommend follow-up ultrasound in 1 week.  Will discharge at  this time with symptomatic cares as an outpatient for abdominal pain.       Medical Decision Making    History:  Supplemental history from: Documented in chart  External Record(s) reviewed: Documented in chart    Work Up:  Chart documentation includes differential considered and any EKGs or imaging independently interpreted by provider, where specified.  In additional to work up documented, I considered the following work up: Documented in chart, if applicable.    External consultation:  Discussion of management with another provider: Documented in chart, if applicable    Complicating factors:  Care impacted by chronic illness: N/A  Care affected by social determinants of health: N/A    Disposition considerations: Discharge. No recommendations on prescription strength medication(s). See documentation for any additional details.        At the conclusion of the encounter I discussed the results of all of the tests and the disposition. The questions were answered. The patient or family acknowledged understanding and was agreeable with the care plan.     0 minutes of critical care time     MEDICATIONS GIVEN IN THE EMERGENCY:  Medications   HYDROmorphone (PF) (DILAUDID) injection 0.5 mg (0.5 mg Intravenous $Given 24 9281)   iopamidol (ISOVUE-370) solution 90 mL (90 mLs Intravenous $Given 24 9808)       NEW PRESCRIPTIONS STARTED AT TODAY'S ER VISIT  New Prescriptions    No medications on file          =================================================================    HPI    Patient information was obtained from: patient,     Use of : N/A        George Hill is a 38 year old female with a pertinent history of anxiety who presents to this ED for evaluation of abdominal pain.  She has a history of tubal ligation,  section, anxiety.  Yesterday she had gradual onset lower midline abdominal pain that radiates directly to her mid lower back.  She has never had this much pain with menses  "previously, and is concerned there is something else going on.  Her pain is intermittent.  She does not have associated fevers.  She does not have dysuria or hematuria.  No black or bloody stools.  No diarrhea or constipation.  No nausea/vomiting.      PAST MEDICAL HISTORY:  History reviewed. No pertinent past medical history.    PAST SURGICAL HISTORY:  Past Surgical History:   Procedure Laterality Date     SECTION             CURRENT MEDICATIONS:    cholecalciferol, vitamin D3, 2,000 unit Tab        ALLERGIES:  No Known Allergies    FAMILY HISTORY:  Family History   Problem Relation Age of Onset    Colon Cancer Mother     Brain Cancer Father        SOCIAL HISTORY:   Social History     Socioeconomic History    Marital status:    Tobacco Use    Smoking status: Never    Smokeless tobacco: Never   Substance and Sexual Activity    Alcohol use: No    Drug use: No    Sexual activity: Yes     Partners: Male       VITALS:  /60   Pulse 69   Temp 98  F (36.7  C) (Temporal)   Resp 18   Ht 1.702 m (5' 7\")   Wt 81.6 kg (180 lb)   SpO2 97%   BMI 28.19 kg/m      PHYSICAL EXAM    Physical Exam  Vitals and nursing note reviewed.   Constitutional:       General: She is not in acute distress.     Appearance: Normal appearance. She is normal weight. She is not ill-appearing.   HENT:      Head: Normocephalic and atraumatic.      Nose: Nose normal.      Mouth/Throat:      Mouth: Mucous membranes are moist.      Pharynx: Oropharynx is clear.   Eyes:      Extraocular Movements: Extraocular movements intact.      Conjunctiva/sclera: Conjunctivae normal.      Pupils: Pupils are equal, round, and reactive to light.   Cardiovascular:      Rate and Rhythm: Normal rate and regular rhythm.      Pulses: Normal pulses.      Heart sounds: Normal heart sounds. No murmur heard.  Pulmonary:      Effort: Pulmonary effort is normal. No respiratory distress.      Breath sounds: Normal breath sounds.   Abdominal:      " General: Abdomen is flat. There is no distension.      Palpations: Abdomen is soft. There is no mass.      Tenderness: There is abdominal tenderness (suprapubic). There is guarding. There is no right CVA tenderness, left CVA tenderness or rebound.      Hernia: No hernia is present.      Comments: Negative psoas, negative heel jar   Musculoskeletal:         General: Normal range of motion.      Cervical back: Normal range of motion.      Thoracic back: No bony tenderness.      Lumbar back: No bony tenderness.      Right lower leg: No edema.      Left lower leg: No edema.   Skin:     General: Skin is warm and dry.      Capillary Refill: Capillary refill takes less than 2 seconds.      Coloration: Skin is not jaundiced.   Neurological:      General: No focal deficit present.      Mental Status: She is alert and oriented to person, place, and time. Mental status is at baseline.   Psychiatric:         Mood and Affect: Mood normal.         Behavior: Behavior normal.         Thought Content: Thought content normal.         Judgment: Judgment normal.            LAB:  All pertinent labs reviewed and interpreted.  Results for orders placed or performed during the hospital encounter of 02/09/24   CT Abdomen Pelvis w Contrast    Impression    IMPRESSION:   1.  There is a hypodensity distending the endocervical canal measuring 20 mm in thickness. Recommend further evaluation with pelvic ultrasound.   US Pelvic Complete with Transvaginal    Impression    IMPRESSION:  1.  Complex mobile echogenic material in the endometrial canal at the lower uterine segment, expanding the endocervical canal to 20 mm, likely blood products and clot. A follow-up pelvic ultrasound in a few weeks is suggested to ensure resolution.         Basic metabolic panel   Result Value Ref Range    Sodium 140 135 - 145 mmol/L    Potassium 3.7 3.4 - 5.3 mmol/L    Chloride 103 98 - 107 mmol/L    Carbon Dioxide (CO2) 28 22 - 29 mmol/L    Anion Gap 9 7 - 15 mmol/L     Urea Nitrogen 7.4 6.0 - 20.0 mg/dL    Creatinine 0.66 0.51 - 0.95 mg/dL    GFR Estimate >90 >60 mL/min/1.73m2    Calcium 9.9 8.6 - 10.0 mg/dL    Glucose 111 (H) 70 - 99 mg/dL   Hepatic function panel   Result Value Ref Range    Protein Total 7.7 6.4 - 8.3 g/dL    Albumin 4.5 3.5 - 5.2 g/dL    Bilirubin Total 0.3 <=1.2 mg/dL    Alkaline Phosphatase 79 40 - 150 U/L    AST 18 0 - 45 U/L    ALT 12 0 - 50 U/L    Bilirubin Direct <0.20 0.00 - 0.30 mg/dL   Result Value Ref Range    Lipase 22 13 - 60 U/L   HCG qualitative urine   Result Value Ref Range    hCG Urine Qualitative Negative Negative   UA with Microscopic reflex to Culture    Specimen: Urine, Clean Catch   Result Value Ref Range    Color Urine Light Yellow Colorless, Straw, Light Yellow, Yellow    Appearance Urine Clear Clear    Glucose Urine Negative Negative mg/dL    Bilirubin Urine Negative Negative    Ketones Urine Negative Negative mg/dL    Specific Gravity Urine 1.019 1.001 - 1.030    Blood Urine 1.0 mg/dL (A) Negative    pH Urine 7.0 5.0 - 7.0    Protein Albumin Urine 10 (A) Negative mg/dL    Urobilinogen Urine <2.0 <2.0 mg/dL    Nitrite Urine Negative Negative    Leukocyte Esterase Urine Negative Negative    Mucus Urine Present (A) None Seen /LPF    RBC Urine 8 (H) <=2 /HPF    WBC Urine 1 <=5 /HPF    Squamous Epithelials Urine 1 <=1 /HPF   CBC with platelets and differential   Result Value Ref Range    WBC Count 10.5 4.0 - 11.0 10e3/uL    RBC Count 4.89 3.80 - 5.20 10e6/uL    Hemoglobin 12.7 11.7 - 15.7 g/dL    Hematocrit 38.9 35.0 - 47.0 %    MCV 80 78 - 100 fL    MCH 26.0 (L) 26.5 - 33.0 pg    MCHC 32.6 31.5 - 36.5 g/dL    RDW 12.9 10.0 - 15.0 %    Platelet Count 319 150 - 450 10e3/uL    % Neutrophils 78 %    % Lymphocytes 15 %    % Monocytes 6 %    % Eosinophils 1 %    % Basophils 0 %    % Immature Granulocytes 0 %    NRBCs per 100 WBC 0 <1 /100    Absolute Neutrophils 8.2 1.6 - 8.3 10e3/uL    Absolute Lymphocytes 1.6 0.8 - 5.3 10e3/uL    Absolute  Monocytes 0.6 0.0 - 1.3 10e3/uL    Absolute Eosinophils 0.1 0.0 - 0.7 10e3/uL    Absolute Basophils 0.0 0.0 - 0.2 10e3/uL    Absolute Immature Granulocytes 0.0 <=0.4 10e3/uL    Absolute NRBCs 0.0 10e3/uL       RADIOLOGY:  Reviewed all pertinent imaging. Please see official radiology report.  US Pelvic Complete with Transvaginal   Final Result   IMPRESSION:   1.  Complex mobile echogenic material in the endometrial canal at the lower uterine segment, expanding the endocervical canal to 20 mm, likely blood products and clot. A follow-up pelvic ultrasound in a few weeks is suggested to ensure resolution.               CT Abdomen Pelvis w Contrast   Final Result   IMPRESSION:    1.  There is a hypodensity distending the endocervical canal measuring 20 mm in thickness. Recommend further evaluation with pelvic ultrasound.          PROCEDURES:   None      [facility] Documentation:   CMS Diagnoses:                 Daniel Domingo MD  [facility] EMERGENCY ROOM  [facility] [facility] [id number] [phone]       Daniel Domingo MD  02/09/24 8825

## 2024-02-09 NOTE — ED TRIAGE NOTES
Pt c/o lower abdomen pain that radiates to her lower back. This pain started with her period. She denies having pain like this in the past. She last took tylenol earlier this morning and ibuprofen at about 1200. Denies any heavier bleeding than normal. Unsure if she has a hx of cyst or fibroids.

## 2024-02-10 NOTE — DISCHARGE INSTRUCTIONS
Please return to the emergency department if your symptoms worsen.  Otherwise follow-up with your primary care provider, as you will need a repeat ultrasound in 1 week for the finding on your ultrasound, but is most likely blood products from your current period.

## 2024-03-03 ENCOUNTER — HEALTH MAINTENANCE LETTER (OUTPATIENT)
Age: 39
End: 2024-03-03

## 2024-07-28 NOTE — PROGRESS NOTES
Assessment/plan   George Hill is a 36 year old female who is established to my practice.    George was seen today for vaginal problem.    Diagnoses and all orders for this visit:    Dysuria  She had a negative UA essentially on 2/11 aside from trace blood (starting her menses) and a few squamous epithelial cells and was given empiric Macrobid based on her history and exam at the time.  She explains that she was drinking a ton of fluids and potentially feels it was diluted out on the 2/11 urine sample and is really strongly feeling like she still has a UTI and would like a change in antibiotics.  I would have preferred to get a urine culture however she is unable to leave a urine sample as she just went to the bathroom prior to my visit.  Given the severity of her symptoms I will provide Bactrim at this time however if is not improving she really needs to have reassessment of her urine.  We reviewed potential alternative causes for the burning sensation that she points to at the pubic symphysis area and does not appear to be deep/bony nor skin related.  Her urethra does not appear inflamed.  Given the lack of vaginal discharge and currently on her menstrual cycle we are unable to consider repeat wet prep which was negative on 2/11.  At this point I do not think that is contributing to her symptoms.  -     sulfamethoxazole-trimethoprim (BACTRIM DS) 800-160 MG tablet; Take 1 tablet by mouth 2 times daily for 3 days  - can try azo/pyridium        Follow up: Return in about 1 week (around 2/24/2022) for Recheck, as needed.      Sera Herbert MD    Subjective:      HPI: George Hill is a 36 year old female who is here for:    Chief Complaint   Patient presents with     Vaginal Problem     no pain with urination, pain/burning in vaginal area, sx have not resolved since last visit with Dr. Li       Vaginal symptoms: She was seen on 2/11/2021 for dysuria and vaginal irritation with a little bit of  "discharge.  Her UA was not particularly remarkable and wet prep was negative aside from 1+ WBCs.  She was offered treatment empirically for UTI and BV given the severity of her symptoms.  She reports using the vaginal gel just for two days because her period started on .    To review:  Dull vaginal pain for 3 weeks, constant  Points to top of pubic symphysis as location of pain; no pain over the bladder  Urination does not cause pain specifically \"but I'm drinking a ton of fluids \"  Burning sensation is present \"like when I had a UTI\"  She is on her period, on her 5th day of flow; lighter    Review of Systems:  No fevers or chills  No flank pain    Of note she has had extensive neurologic work-up for abnormal dizzy sensations that has been present for the past 3 years including tinnitus of the left side.  She has had work-up with ENT, neurology and vestibular rehab and head imaging which is all unremarkable.  She has been encouraged to start Celexa for anxiety but didn't start it and doesn't want to start.      12 point comprehensive review of systems was negative except as noted and HPI     Social History:  Social History     Social History Narrative     Not on file       Medical History:  There is no problem list on file for this patient.    No past medical history on file.  Past Surgical History:   Procedure Laterality Date      SECTION       Patient has no known allergies.  Family History   Problem Relation Age of Onset     Colon Cancer Mother      Brain Cancer Father        Medications:  Current Outpatient Medications   Medication     sulfamethoxazole-trimethoprim (BACTRIM DS) 800-160 MG tablet     cholecalciferol, vitamin D3, 2,000 unit Tab     metroNIDAZOLE (METROGEL) 0.75 % vaginal gel     nitroFURantoin macrocrystal-monohydrate (MACROBID) 100 MG capsule     No current facility-administered medications for this visit.         Imaging & Labs reviewed this visit:     Recent Results (from the " "past 240 hour(s))   Wet prep - Clinic Collect    Collection Time: 02/11/22 10:01 AM    Specimen: Vagina; Swab   Result Value Ref Range    Trichomonas Absent Absent    Yeast Absent Absent    Clue Cells Absent Absent    WBCs/high power field 1+ (A) None   UA Macro with Reflex to Micro and Culture - lab collect    Collection Time: 02/11/22 10:01 AM    Specimen: Urine, Midstream   Result Value Ref Range    Color Urine Yellow Colorless, Straw, Light Yellow, Yellow    Appearance Urine Clear Clear    Glucose Urine Negative Negative mg/dL    Bilirubin Urine Negative Negative    Ketones Urine Negative Negative mg/dL    Specific Gravity Urine 1.010 1.005 - 1.030    Blood Urine Trace (A) Negative    pH Urine 7.0 5.0 - 8.0    Protein Albumin Urine Negative Negative mg/dL    Urobilinogen Urine 0.2 0.2, 1.0 E.U./dL    Nitrite Urine Negative Negative    Leukocyte Esterase Urine Negative Negative   Urine Microscopic    Collection Time: 02/11/22 10:01 AM   Result Value Ref Range    Bacteria Urine None Seen None Seen /HPF    RBC Urine 0-2 0-2 /HPF /HPF    WBC Urine None Seen 0-5 /HPF /HPF    Squamous Epithelials Urine Few (A) None Seen /LPF           Objective:      Vitals:    02/17/22 1025   BP: 102/56   Pulse: 92   Weight: 70 kg (154 lb 4.8 oz)       Physical Exam:     General: Alert, no acute distress. Mldly anxious  HEENT: normocephalic conjunctivae are clear  Neck: supple without adenopathy or thyromegaly.  Lungs: Good aeration bilaterally. Clear to auscultation without wheezes, rales or rhonci.   Heart: regular rate and rhythm, normal S1 and S2, no murmurs  Abdomen: soft and nontender, no suprapubic tenderness with deep palpation.  No CVA tenderness bilaterally.  Skin: clear without rash or lesions  Neuro: alert, interactive moving all extremities equally, normal muscle tone in all 4 extremities  : External genitalia appear normal, she points to the mid pubic symphysis as the area of her \"burning \"pain sensation.  The skin " around this area appears normal, not inflamed, no irritative symptoms or lesions identified.  On internal exam the urethra appears normal and is nontender to light palpation with a lighted speculum.  The vaginal wall appears normal, with physiologic blood for discharge as she is on her menstrual cycle.  Sera Herbert MD          Answers for HPI/ROS submitted by the patient on 2/17/2022  How many servings of fruits and vegetables do you eat daily?: 2-3  On average, how many sweetened beverages do you drink each day (Examples: soda, juice, sweet tea, etc.  Do NOT count diet or artificially sweetened beverages)?: 0  How many minutes a day do you exercise enough to make your heart beat faster?: 10 to 19  How many days a week do you exercise enough to make your heart beat faster?: 3 or less  How many days per week do you miss taking your medication?: 0  What is the reason for your visit today?: UTI  When did your symptoms begin?: 1-2 weeks ago  What are your symptoms?: UTI  How would you describe these symptoms?: Severe  Are your symptoms:: Staying the same  Have you had these symptoms before?: No  Is there anything that makes you feel worse?: NA  Is there anything that makes you feel better?: NA       Home

## 2024-11-26 ENCOUNTER — OFFICE VISIT (OUTPATIENT)
Dept: URGENT CARE | Facility: URGENT CARE | Age: 39
End: 2024-11-26
Payer: COMMERCIAL

## 2024-11-26 ENCOUNTER — ANCILLARY PROCEDURE (OUTPATIENT)
Dept: GENERAL RADIOLOGY | Facility: CLINIC | Age: 39
End: 2024-11-26
Attending: FAMILY MEDICINE
Payer: COMMERCIAL

## 2024-11-26 VITALS
DIASTOLIC BLOOD PRESSURE: 77 MMHG | HEART RATE: 98 BPM | TEMPERATURE: 98.9 F | RESPIRATION RATE: 18 BRPM | OXYGEN SATURATION: 100 % | SYSTOLIC BLOOD PRESSURE: 114 MMHG

## 2024-11-26 DIAGNOSIS — R05.1 ACUTE COUGH: ICD-10-CM

## 2024-11-26 DIAGNOSIS — J18.9 PNEUMONIA OF RIGHT LOWER LOBE DUE TO INFECTIOUS ORGANISM: Primary | ICD-10-CM

## 2024-11-26 PROCEDURE — 71046 X-RAY EXAM CHEST 2 VIEWS: CPT | Mod: TC | Performed by: RADIOLOGY

## 2024-11-26 PROCEDURE — 99213 OFFICE O/P EST LOW 20 MIN: CPT | Performed by: FAMILY MEDICINE

## 2024-11-26 RX ORDER — AZITHROMYCIN 250 MG/1
TABLET, FILM COATED ORAL
Qty: 6 TABLET | Refills: 0 | Status: SHIPPED | OUTPATIENT
Start: 2024-11-26

## 2024-11-26 NOTE — PROGRESS NOTES
Assessment:     Pneumonia of right lower lobe due to infectious organism  - amoxicillin-clavulanate (AUGMENTIN) 875-125 MG tablet  Dispense: 20 tablet; Refill: 0  - azithromycin (ZITHROMAX) 250 MG tablet  Dispense: 6 tablet; Refill: 0    Acute cough  - XR Chest 2 Views    Plan:       Chest x-ray ordered and personally reviewed by myself showing a right lower lobe infiltrate, consistent with right lower lobe pneumonia.  Will cover with Augmentin and Zithromax.  Take Tylenol or ibuprofen as needed for fever or discomfort.  Follow-up if symptoms getting worse or not improving in the next 3 to 4 days.  Patient agreeable with this plan.    MEDICATIONS:   Orders Placed This Encounter   Medications    amoxicillin-clavulanate (AUGMENTIN) 875-125 MG tablet     Sig: Take 1 tablet by mouth 2 times daily for 10 days.     Dispense:  20 tablet     Refill:  0    azithromycin (ZITHROMAX) 250 MG tablet     Sig: Take 2 tabs day 1, then 1 tab daily on days 2-5     Dispense:  6 tablet     Refill:  0       Subjective:       39 year old female presents for evaluation of 11-day history of productive cough.  She states the cough is almost continuous day and night and she is not able to sleep due to the pain.  She has not had any fevers but has been feeling very rundown and chilled.  No significant nasal congestion.  She denies shortness of breath or wheezing.  No ear pain or sore throat.  She has tried over-the-counter cough syrup which has not been helpful.    There is no problem list on file for this patient.      No past medical history on file.    Past Surgical History:   Procedure Laterality Date     SECTION         Current Outpatient Medications   Medication Sig Dispense Refill    cholecalciferol, vitamin D3, 2,000 unit Tab Take 2,000 Units by mouth  (Patient not taking: Reported on 2024)       No current facility-administered medications for this visit.       No Known Allergies    Family History   Problem Relation  Age of Onset    Colon Cancer Mother     Brain Cancer Father        Social History     Socioeconomic History    Marital status:    Tobacco Use    Smoking status: Never    Smokeless tobacco: Never   Substance and Sexual Activity    Alcohol use: No    Drug use: No    Sexual activity: Yes     Partners: Male         Review of Systems  Pertinent items are noted in HPI.      Objective:                 General Appearance:    /77   Pulse 98   Temp 98.9  F (37.2  C) (Oral)   Resp 18   SpO2 100%         Alert, pleasant, cooperative, no distress, appears stated age   Head:    Normocephalic, without obvious abnormality, atraumatic   Eyes:    Conjunctiva/corneas clear   Ears:    Normal TM's without erythema or bulging. Normal external ear canals, both ears   Nose:   Nares normal, septum midline, mucosa normal, no drainage    or sinus tenderness   Throat:   Lips, mucosa, and tongue normal; teeth and gums normal.  No tonsilar hypertrophy or exudate.   Neck:   Supple, symmetrical, trachea midline, no adenopathy    Lungs:   Coarse rhonchi heard in the right lower lung field.  Left lung is clear.  No wheezes heard.  Overall good aeration noted.    Heart:    Regular rate and rhythm, S1 and S2 normal, no murmur, rub or gallop       Extremities:   Extremities normal, atraumatic, no cyanosis or edema   Skin:   Skin color, texture, turgor normal, no rashes or lesions         This note has been dictated using voice recognition software. Any grammatical or context distortions are unintentional and inherent to the software

## 2025-03-11 ENCOUNTER — OFFICE VISIT (OUTPATIENT)
Dept: FAMILY MEDICINE | Facility: CLINIC | Age: 40
End: 2025-03-11
Payer: COMMERCIAL

## 2025-03-11 VITALS
HEART RATE: 76 BPM | HEIGHT: 68 IN | TEMPERATURE: 98.3 F | BODY MASS INDEX: 28.4 KG/M2 | SYSTOLIC BLOOD PRESSURE: 121 MMHG | OXYGEN SATURATION: 100 % | DIASTOLIC BLOOD PRESSURE: 73 MMHG | WEIGHT: 187.4 LBS

## 2025-03-11 DIAGNOSIS — R26.89 IMBALANCE: ICD-10-CM

## 2025-03-11 DIAGNOSIS — Z00.00 ROUTINE GENERAL MEDICAL EXAMINATION AT A HEALTH CARE FACILITY: Primary | ICD-10-CM

## 2025-03-11 DIAGNOSIS — R73.09 ELEVATED GLUCOSE LEVEL: ICD-10-CM

## 2025-03-11 DIAGNOSIS — Z13.220 ENCOUNTER FOR LIPID SCREENING FOR CARDIOVASCULAR DISEASE: ICD-10-CM

## 2025-03-11 DIAGNOSIS — K62.1 SESSILE RECTAL POLYP: ICD-10-CM

## 2025-03-11 DIAGNOSIS — Z11.4 SCREENING FOR HIV (HUMAN IMMUNODEFICIENCY VIRUS): ICD-10-CM

## 2025-03-11 DIAGNOSIS — Z13.1 SCREENING FOR DIABETES MELLITUS: ICD-10-CM

## 2025-03-11 DIAGNOSIS — Z13.6 ENCOUNTER FOR LIPID SCREENING FOR CARDIOVASCULAR DISEASE: ICD-10-CM

## 2025-03-11 DIAGNOSIS — Z11.59 NEED FOR HEPATITIS C SCREENING TEST: ICD-10-CM

## 2025-03-11 DIAGNOSIS — Z80.0 FAMILY HISTORY OF COLON CANCER: ICD-10-CM

## 2025-03-11 LAB
EST. AVERAGE GLUCOSE BLD GHB EST-MCNC: 120 MG/DL
HBA1C MFR BLD: 5.8 % (ref 0–5.6)

## 2025-03-11 SDOH — HEALTH STABILITY: PHYSICAL HEALTH: ON AVERAGE, HOW MANY MINUTES DO YOU ENGAGE IN EXERCISE AT THIS LEVEL?: 20 MIN

## 2025-03-11 SDOH — HEALTH STABILITY: PHYSICAL HEALTH: ON AVERAGE, HOW MANY DAYS PER WEEK DO YOU ENGAGE IN MODERATE TO STRENUOUS EXERCISE (LIKE A BRISK WALK)?: 1 DAY

## 2025-03-11 ASSESSMENT — SOCIAL DETERMINANTS OF HEALTH (SDOH): HOW OFTEN DO YOU GET TOGETHER WITH FRIENDS OR RELATIVES?: TWICE A WEEK

## 2025-03-11 NOTE — PROGRESS NOTES
Preventive Care Visit  [facility]  Altagracia Velásquez MD, Family Medicine  Mar 11, 2025      Assessment & Plan     Routine general medical examination at a health care facility  -Lipid panel obtained 3/11/25.   -Glu 111 obtained 24 with BMP  - A1c obtained 3/11/25.  - Pap smear: Obtained results pending.  Last Pap in 2020, NILM & HPV neg. Due Aug 2025  -BMI 28.80 with weight 187 lb.   -Tubal ligation for birth control.  Done in 2018. . Both CS  - STD testing declined today (HIV, RPR, Hep C, G/C)  - Flu and covid vaccine declined today. TDAP and HPV vaccine given  - Tobacco use? Does not smoke  - Alcohol use? Does not drink  - Mammo: Not old enough. Due at next physcial  - Colon: Dues for screening now given hx of colon cancer in mother and hx of sessile rectal polyp  - REVIEW OF HEALTH MAINTENANCE PROTOCOL ORDERS  - Lipid panel reflex to direct LDL Non-fasting; Future  - Hemoglobin A1c; Future    Encounter for lipid screening for cardiovascular disease  - Lipid panel reflex to direct LDL Non-fasting; Future    Elevated glucose level  Screening for diabetes mellitus  - Hemoglobin A1c; Future    Family history of colon cancer  Sessile rectal polyp  Family history of colon cancer necessitates earlier screening. She already underwent early screening with a colonoscopy in [facility] in 2018. There is a history of a sessile rectal polyp found in 2018. Ir was recommended at the time for her to repeat colonoscopy in 5 years.   - Colonoscopy Screening  Referral; Future    Imbalance  Imbalance with shakiness has been previously evaluated by neurologists and treated with rehabilitation. The plan includes providing a letter for a 10-week work leave for rehabilitation.  -Wrote FMLA paper work for patient. See letter tab for details    Patient has been advised of split billing requirements and indicates understanding: Yes      BMI  Estimated body mass index is 28.8 kg/m  as calculated  "from the following:    Height as of this encounter: 1.718 m (5' 7.64\").    Weight as of this encounter: 85 kg (187 lb 6.4 oz).   Weight management plan: Discussed healthy diet and exercise guidelines    Counseling  Appropriate preventive services were addressed with this patient via screening, questionnaire, or discussion as appropriate for fall prevention, nutrition, physical activity, Tobacco-use cessation, social engagement, weight loss and cognition.  Checklist reviewing preventive services available has been given to the patient.  Reviewed patient's diet, addressing concerns and/or questions.   She is at risk for lack of exercise and has been provided with information to increase physical activity for the benefit of her well-being.   She is at risk for psychosocial distress and has been provided with information to reduce risk.       Barrie Petersen is a 39 year old, presenting for the following:  Physical        3/11/2025    12:43 PM   Additional Questions   Roomed by MAEVE ALBERTO    Physical  Nicola Hill, a 39-year-old female is here for  a physical. In terms of family history, her father has diabetes and had brain cancer. Her mother was diagnosed with colon cancer at age 55 and has high blood pressure. There is no personal history of polyps or other gastrointestinal issues. Regarding her past surgical history, she underwent a tubal ligation after the birth of her second child. She has had two pregnancies, both delivered via . BMI of 28.80 and a weight of approximately 187 lbs. She is not currently using any birth control methods.    Imbalance issues  Hanny reports experiencing shakiness and imbalance for the past three years. She underwent multiple evaluations, including MRIs, which returned normal results. She previously attended rehabilitation for the condition. Symptoms were triggered again last month, leading to significant discomfort and the need for a break from work. She is here to " request FMLA paperwork to be filled out for her to get 10 weeks unpaid time off      Advance Care Planning  Patient does not have a Health Care Directive: Discussed advance care planning with patient; information given to patient to review.      3/11/2025   General Health   How would you rate your overall physical health? Good   Feel stress (tense, anxious, or unable to sleep) To some extent   (!) STRESS CONCERN      3/11/2025   Nutrition   Three or more servings of calcium each day? Yes   Diet: Regular (no restrictions)   How many servings of fruit and vegetables per day? (!) 0-1   How many sweetened beverages each day? 0-1         3/11/2025   Exercise   Days per week of moderate/strenous exercise 1 day   Average minutes spent exercising at this level 20 min   (!) EXERCISE CONCERN      3/11/2025   Social Factors   Frequency of gathering with friends or relatives Twice a week   Worry food won't last until get money to buy more No   Food not last or not have enough money for food? No   Do you have housing? (Housing is defined as stable permanent housing and does not include staying ouside in a car, in a tent, in an abandoned building, in an overnight shelter, or couch-surfing.) Yes   Are you worried about losing your housing? No   Lack of transportation? No   Unable to get utilities (heat,electricity)? No         3/11/2025   Dental   Dentist two times every year? Yes           Today's PHQ-2 Score:       3/11/2025     9:12 AM   PHQ-2 ( 1999 [employer])   Q1: Little interest or pleasure in doing things 0   Q2: Feeling down, depressed or hopeless 0   PHQ-2 Score 0    Q1: Little interest or pleasure in doing things Not at all   Q2: Feeling down, depressed or hopeless Not at all   PHQ-2 Score 0       Patient-reported           3/11/2025   Substance Use   Alcohol more than 3/day or more than 7/wk Not Applicable   Do you use any other substances recreationally? No     Social History     Tobacco Use    Smoking status: Never      Passive exposure: Never    Smokeless tobacco: Never   Vaping Use    Vaping status: Never Used   Substance Use Topics    Alcohol use: No    Drug use: No           2022   LAST FHS-7 RESULTS   1st degree relative breast or ovarian cancer No    Any relative bilateral breast cancer No    Any male have breast cancer No    Any ONE woman have BOTH breast AND ovarian cancer No    Any woman with breast cancer before 50yrs No    2 or more relatives with breast AND/OR ovarian cancer No    2 or more relatives with breast AND/OR bowel cancer No        Proxy-reported        Mammogram Screening - Patient under 40 years of age: Routine Mammogram Screening not recommended.           3/11/2025   One time HIV Screening   Previous HIV test? No         3/11/2025   STI Screening   New sexual partner(s) since last STI/HIV test? No     History of abnormal Pap smear: No - age 30-64 HPV with reflex Pap every 5 years recommended        Latest Ref Rng & Units 2020     9:42 AM   PAP / HPV   PAP Negative for squamous intraepithelial lesion or malignancy. Negative for squamous intraepithelial lesion or malignancy  Electronically signed by Mayi Avelar CT (ASCP) on 2020 at 11:23 AM      HPV 16 DNA NEG Negative    HPV 18 DNA NEG Negative    Other HR HPV NEG Negative            3/11/2025   Contraception/Family Planning   Questions about contraception or family planning No        Reviewed and updated as needed this visit by Provider                    No past medical history on file.  Past Surgical History:   Procedure Laterality Date     SECTION  2013    COLONOSCOPY      GYN SURGERY       OB History    Para Term  AB Living   2 2 2 0 0 1   SAB IAB Ectopic Multiple Live Births   0 0 0 0 1      # Outcome Date GA Lbr Barney/2nd Weight Sex Type Anes PTL Lv   2 Term 18 38w5d  3.05 kg (6 lb 11.6 oz) F CS-LTranv Spinal  JENN      Name: ERIC,FEMALE-FERNANDEZ      Apgar1: 9  Apgar5: 9   1 Term           "    Lab work is in process  BP Readings from Last 3 Encounters:   03/11/25 121/73   11/26/24 114/77   02/09/24 104/59    Wt Readings from Last 3 Encounters:   03/11/25 85 kg (187 lb 6.4 oz)   02/09/24 81.6 kg (180 lb)   01/19/23 73.5 kg (162 lb)                  No Known Allergies  Recent Labs   Lab Test 03/11/25  1337 02/09/24  1330 11/11/22  1432 01/13/22  1607 01/13/22  1607 09/27/21  1059 08/20/20  0942   A1C 5.8*  --   --   --   --   --   --    LDL  --   --   --   --   --  88 84   HDL  --   --   --   --   --  53 63   TRIG  --   --   --   --   --  70 75   ALT  --  12 16  --  12  --   --    CR  --  0.66 0.69   < > 0.67  --   --    GFRESTIMATED  --  >90 >90   < > >90  --   --    POTASSIUM  --  3.7 3.9  --  4.2  --   --    TSH  --   --  1.25  --  1.20  --   --     < > = values in this interval not displayed.         Objective    Exam  /73 (BP Location: Right arm, Patient Position: Sitting, Cuff Size: Adult Regular)   Pulse 76   Temp 98.3  F (36.8  C) (Oral)   Ht 1.718 m (5' 7.64\")   Wt 85 kg (187 lb 6.4 oz)   LMP 03/06/2025 (Exact Date)   SpO2 100%   BMI 28.80 kg/m     Estimated body mass index is 28.8 kg/m  as calculated from the following:    Height as of this encounter: 1.718 m (5' 7.64\").    Weight as of this encounter: 85 kg (187 lb 6.4 oz).    Physical Exam  GENERAL: alert and no distress  EYES: Eyes grossly normal to inspection, PERRL and conjunctivae and sclerae normal  HENT: ear canals and TM's normal, nose and mouth without ulcers or lesions  NECK: no adenopathy, no asymmetry, masses, or scars  RESP: lungs clear to auscultation - no rales, rhonchi or wheezes  CV: regular rate and rhythm, normal S1 S2, no S3 or S4, no murmur, click or rub, no peripheral edema  ABDOMEN: soft, nontender, no hepatosplenomegaly, no masses and bowel sounds normal  MS: no gross musculoskeletal defects noted, no edema  SKIN: no suspicious lesions or rashes  NEURO: Normal strength and tone, mentation intact and speech " normal  PSYCH: mentation appears normal, affect normal/bright    Spent 30mins doing chart review, history and exam, patient education, documentation and further activities per the note.    Signed Electronically by: Altagracia Velásquez MD

## 2025-03-11 NOTE — LETTER
Certification of Health Care Provider  Family Medical Leave Act (FMLA)      Patient's name: George Hill    Employer's name and contact: [employer] [phone]    Employee's job title:  Regular work schedule: 8am -5pm daily    Employee's essential job functions: Development    Provider's name and business address:  [address] [address]  Phone: [phone]  Fax: [phone]      PART A:  MEDICAL FACTS  1)  Approximate date condition commenced:  3/17/24    Probable duration of condition:  10 weeks     Prince below as applicable:  Was the patient admitted for an overnight stay in a hospital, hospice, or residential medical care facility?  no.    Date(s) you treated the patient for condition: 2/4/2022    Will the patient need to have treatment visits at least twice per year due to the                 condition? yes    Was medication, other than over-the-counter medication prescribed?  no    Was the patient referred to other health care provider(s) for evaluation or treatment     (e.g., physical therapist)?  yes:  State the nature of such treatments and expected duration of treatment: Was sent to rehab for conditioning Oct 2021 through May 2022        2)  Is the medical condition pregnancy?  no.      3)  Is the employee unable to perform any of his/her job functions due to the     condition: yes:  Identify the job functions the employee is unable to perform: All job functions      4)  Describe other relevant medical facts, if any, related to the condition which the employee seeks leave (such as medical facts may include symptoms, diagnosis, or any regimen of continuing treatment such as the use of specialized equipment):   Patient is diagnosed with vestibular migraines leading to imbalance. Has seen ENT and audiology and did did not find any cause. Has done vestibular rehab without  any benefit. Had extensive testing done in [place] including an MRI, Dopplers, EEG, vertigo testing.       PART B: AMOUNT OF LEAVE NEEDED  5)  Will the employee be incapacitated for a single continuous period of time due to his/her medical condition, including any time for treatment and recovery?  no.    6)  Will the employee need to attend follow-up treatment appointments or work part-time or on a reduced schedule because of the employee's medical condition?  no.    7) Will the condition cause episodic flare ups periodically preventing the employee from performing his/her job functions? yes:  Is it medically necessary for the patient to be absent from work during the flare-ups?  yes:  Explain: When she gets unbalanced, the migraines flare up and she is unable to perform her job functions unless she goes home and rests in a dark and quiet place.    Based upon the patient's medical history and your knowledge of the medical condition, estimate the frequency of flare-ups and the duration of related incapacity that the patient may have over the next six months (e.g., 1 episode every 3 months lasting 1-2 days):    Frequency and Duration: Constant, patient is at most times able to work through it but not any more          ================================================================    TO BE COMPLETED BY THE HEALTH CARE PROVIDER:    Signature:  Altagracia Velásquez MD ________________________________________  Date:   3/11/2025

## 2025-03-11 NOTE — PATIENT INSTRUCTIONS
Patient Education   Preventive Care Advice   This is general advice given by our system to help you stay healthy. However, your care team may have specific advice just for you. Please talk to your care team about your preventive care needs.  Nutrition  Eat 5 or more servings of fruits and vegetables each day.  Try wheat bread, brown rice and whole grain pasta (instead of white bread, rice, and pasta).  Get enough calcium and vitamin D. Check the label on foods and aim for 100% of the RDA (recommended daily allowance).  Lifestyle  Exercise at least 150 minutes each week  (30 minutes a day, 5 days a week).  Do muscle strengthening activities 2 days a week. These help control your weight and prevent disease.  No smoking.  Wear sunscreen to prevent skin cancer.  Have a dental exam and cleaning every 6 months.  Yearly exams  See your health care team every year to talk about:  Any changes in your health.  Any medicines your care team has prescribed.  Preventive care, family planning, and ways to prevent chronic diseases.  Shots (vaccines)   HPV shots (up to age 26), if you've never had them before.  Hepatitis B shots (up to age 59), if you've never had them before.  COVID-19 shot: Get this shot when it's due.  Flu shot: Get a flu shot every year.  Tetanus shot: Get a tetanus shot every 10 years.  Pneumococcal, hepatitis A, and RSV shots: Ask your care team if you need these based on your risk.  Shingles shot (for age 50 and up)  General health tests  Diabetes screening:  Starting at age 35, Get screened for diabetes at least every 3 years.  If you are younger than age 35, ask your care team if you should be screened for diabetes.  Cholesterol test: At age 39, start having a cholesterol test every 5 years, or more often if advised.  Bone density scan (DEXA): At age 50, ask your care team if you should have this scan for osteoporosis (brittle bones).  Hepatitis C: Get tested at least once in your life.  STIs (sexually  transmitted infections)  Before age 24: Ask your care team if you should be screened for STIs.  After age 24: Get screened for STIs if you're at risk. You are at risk for STIs (including HIV) if:  You are sexually active with more than one person.  You don't use condoms every time.  You or a partner was diagnosed with a sexually transmitted infection.  If you are at risk for HIV, ask about PrEP medicine to prevent HIV.  Get tested for HIV at least once in your life, whether you are at risk for HIV or not.  Cancer screening tests  Cervical cancer screening: If you have a cervix, begin getting regular cervical cancer screening tests starting at age 21.  Breast cancer scan (mammogram): If you've ever had breasts, begin having regular mammograms starting at age 40. This is a scan to check for breast cancer.  Colon cancer screening: It is important to start screening for colon cancer at age 45.  Have a colonoscopy test every 10 years (or more often if you're at risk) Or, ask your provider about stool tests like a FIT test every year or Cologuard test every 3 years.  To learn more about your testing options, visit:   .  For help making a decision, visit:   https://bit.ly/ed50011.  Prostate cancer screening test: If you have a prostate, ask your care team if a prostate cancer screening test (PSA) at age 55 is right for you.  Lung cancer screening: If you are a current or former smoker ages 50 to 80, ask your care team if ongoing lung cancer screenings are right for you.  For informational purposes only. Not to replace the advice of your health care provider. Copyright   2023 [facility]. All rights reserved. Clinically reviewed by the [facility]. [employer] [id number] - REV 01/24.  Learning About Stress  What is stress?     Stress is your body's response to a hard situation. Your body can have a physical, emotional, or mental response. Stress is a fact of life for most people, and it  affects everyone differently. What causes stress for you may not be stressful for someone else.  A lot of things can cause stress. You may feel stress when you go on a job interview, take a test, or run a race. This kind of short-term stress is normal and even useful. It can help you if you need to work hard or react quickly. For example, stress can help you finish an important job on time.  Long-term stress is caused by ongoing stressful situations or events. Examples of long-term stress include long-term health problems, ongoing problems at work, or conflicts in your family. Long-term stress can harm your health.  How does stress affect your health?  When you are stressed, your body responds as though you are in danger. It makes hormones that speed up your heart, make you breathe faster, and give you a burst of energy. This is called the fight-or-flight stress response. If the stress is over quickly, your body goes back to normal and no harm is done.  But if stress happens too often or lasts too long, it can have bad effects. Long-term stress can make you more likely to get sick, and it can make symptoms of some diseases worse. If you tense up when you are stressed, you may develop neck, shoulder, or low back pain. Stress is linked to high blood pressure and heart disease.  Stress also harms your emotional health. It can make you winter, tense, or depressed. Your relationships may suffer, and you may not do well at work or school.  What can you do to manage stress?  You can try these things to help manage stress:   Do something active. Exercise or activity can help reduce stress. Walking is a great way to get started. Even everyday activities such as housecleaning or yard work can help.  Try yoga or juan chi. These techniques combine exercise and meditation. You may need some training at first to learn them.  Do something you enjoy. For example, listen to music or go to a movie. Practice your hobby or do volunteer  "work.  Meditate. This can help you relax, because you are not worrying about what happened before or what may happen in the future.  Do guided imagery. Imagine yourself in any setting that helps you feel calm. You can use online videos, books, or a teacher to guide you.  Do breathing exercises. For example:  From a standing position, bend forward from the waist with your knees slightly bent. Let your arms dangle close to the floor.  Breathe in slowly and deeply as you return to a standing position. Roll up slowly and lift your head last.  Hold your breath for just a few seconds in the standing position.  Breathe out slowly and bend forward from the waist.  Let your feelings out. Talk, laugh, cry, and express anger when you need to. Talking with supportive friends or family, a counselor, or a aydin leader about your feelings is a healthy way to relieve stress. Avoid discussing your feelings with people who make you feel worse.  Write. It may help to write about things that are bothering you. This helps you find out how much stress you feel and what is causing it. When you know this, you can find better ways to cope.  What can you do to prevent stress?  You might try some of these things to help prevent stress:  Manage your time. This helps you find time to do the things you want and need to do.  Get enough sleep. Your body recovers from the stresses of the day while you are sleeping.  Get support. Your family, friends, and community can make a difference in how you experience stress.  Limit your news feed. Avoid or limit time on social media or news that may make you feel stressed.  Do something active. Exercise or activity can help reduce stress. Walking is a great way to get started.  Where can you learn more?  Go to https://www.[employer].net/patiented  Enter N032 in the search box to learn more about \"Learning About Stress.\"  Current as of: October 24, 2023  Content Version: 14.3    2024 [employer]. "   Care instructions adapted under license by your healthcare professional. If you have questions about a medical condition or this instruction, always ask your healthcare professional. [employer] disclaims any warranty or liability for your use of this information.

## 2025-03-12 LAB
CHOLEST SERPL-MCNC: 190 MG/DL
FASTING STATUS PATIENT QL REPORTED: NO
HDLC SERPL-MCNC: 61 MG/DL
LDLC SERPL CALC-MCNC: 110 MG/DL
NONHDLC SERPL-MCNC: 129 MG/DL
TRIGL SERPL-MCNC: 93 MG/DL

## 2025-03-18 ENCOUNTER — TELEPHONE (OUTPATIENT)
Dept: FAMILY MEDICINE | Facility: CLINIC | Age: 40
End: 2025-03-18
Payer: COMMERCIAL

## 2025-03-18 NOTE — TELEPHONE ENCOUNTER
Received FMLA paperwork from [employer] [place] in my in basket folder at my desk.  Filled out apart from the additional disability information which I did not fill out as I do not have enough information to fill this supplemental form out.  I placed the completed form on my MA's desk Meeta Chapa as per protocol.  He is to fax the form to the appropriate people and complete this telephone encounter.    Thank you  Dr. Altagracia Velásquez MD

## 2025-03-19 NOTE — TELEPHONE ENCOUNTER
FYI - Status Update    Who is Calling: Mikhail with [employer]    Update:   Mikhail is looking for the second page to be filled out and signed, and faxed back.       Does caller want a call/response back: Yes     Could we send this information to you in [employer] or would you prefer to receive a phone call?:   Patient would prefer a phone call   Okay to leave a detailed message?: Yes at Other phone number:  [phone] ref #: [id number]

## 2025-03-20 ENCOUNTER — TELEPHONE (OUTPATIENT)
Dept: FAMILY MEDICINE | Facility: CLINIC | Age: 40
End: 2025-03-20
Payer: COMMERCIAL

## 2025-03-20 NOTE — TELEPHONE ENCOUNTER
Forms/Letter Request    Type of form/letter: NADER    Have you been seen for this request: Yes     Do we have the form/letter: Yes: at      When is form/letter needed by: ASAP    How would you like the form/letter returned: Fax forms has information where to send to     Patient Notified form requests are processed in 3-5 business days:N/A    Could we send this information to you in [facility] or would you prefer to receive a phone call?:   Patient would prefer a phone call   Okay to leave a detailed message?: Yes at Cell number on file:    Telephone Information:   Mobile [phone]

## 2025-03-20 NOTE — TELEPHONE ENCOUNTER
"Per Dr. Velásquez,     \"Patient is to send the second form to her neurologist that she saw in 2022 since they were the ones to evaluate patient for what she is asking FMLA for. \"      Called and informed patient. Patient verbalized understanding and will come  forms for neurologist to fill.     Forms had been copied and sent to [employer].   "

## 2025-04-06 ENCOUNTER — HEALTH MAINTENANCE LETTER (OUTPATIENT)
Age: 40
End: 2025-04-06

## 2025-04-15 ENCOUNTER — OFFICE VISIT (OUTPATIENT)
Dept: FAMILY MEDICINE | Facility: CLINIC | Age: 40
End: 2025-04-15
Payer: COMMERCIAL

## 2025-04-15 VITALS
RESPIRATION RATE: 18 BRPM | HEART RATE: 82 BPM | BODY MASS INDEX: 26.98 KG/M2 | OXYGEN SATURATION: 99 % | WEIGHT: 178 LBS | HEIGHT: 68 IN | TEMPERATURE: 98 F | DIASTOLIC BLOOD PRESSURE: 72 MMHG | SYSTOLIC BLOOD PRESSURE: 124 MMHG

## 2025-04-15 DIAGNOSIS — G43.809 VESTIBULAR MIGRAINE: ICD-10-CM

## 2025-04-15 DIAGNOSIS — R26.89 IMBALANCE: Primary | ICD-10-CM

## 2025-04-15 DIAGNOSIS — Z02.89 ENCOUNTER FOR REVIEW OF FORM WITH PATIENT: ICD-10-CM

## 2025-04-15 PROCEDURE — 3078F DIAST BP <80 MM HG: CPT

## 2025-04-15 PROCEDURE — 99214 OFFICE O/P EST MOD 30 MIN: CPT

## 2025-04-15 PROCEDURE — G2211 COMPLEX E/M VISIT ADD ON: HCPCS

## 2025-04-15 PROCEDURE — 3074F SYST BP LT 130 MM HG: CPT

## 2025-04-15 ASSESSMENT — ENCOUNTER SYMPTOMS: HEADACHES: 1

## 2025-04-15 NOTE — PROGRESS NOTES
"  Assessment & Plan     Imbalance  Vestibular migraine  Encounter for review of form with patient   Improvement noted with vestibular rehabilitation exercises and with reduced screen time and rest.; approximately 40% of symptoms cleared.  - Continue current regimen and rest at home.  - Request for an extension of FMLA for an additional five weeks starting April 22, 2025, to allow further recovery.  - Provided a letter on letterhead with signature requesting an extension of FMLA for an additional five weeks starting April 22, 2025.  - If additional paperwork is sent to office for me to sign, with the information provided during this visit, I should be able to fill it out without seeing the patient again    Subjective   Nicola is a 40 year old, presenting for the following health issues:  Headache (Vestibular migraines - getting better )        4/15/2025     1:16 PM   Additional Questions   Roomed by FLORES BIRMINGHAM     History of Present Illness       Reason for visit:  Regarding my migraines   Vestibular Migraines/ FMLA Extension:  Nicola Hill, a 40-year-old female, has been experiencing vestibular migraines. She has been actively engaging in vestibular rehabilitation exercises as previously suggested. Although she has not visited the rehabilitation center recently, since she has already been there for treatment prior she has been following the exercise list provided earlier. She reports a 40% improvement in her symptoms, attributing this progress to reduced screen time and complete rest.  Her FMLA was initially approved for five weeks despite it being written for 10 weeks, ending on April 22, 2025, but she is seeking an extension for an additional five weeks to continue her recovery at home. She has been working from home and does have receipts to submit for her rehabilitation activities.        Objective    /72   Pulse 82   Temp 98  F (36.7  C) (Oral)   Resp 18   Ht 1.72 m (5' 7.72\")   Wt 80.7 kg (178 lb)   LMP " 03/27/2025 (Exact Date)   SpO2 99%   BMI 27.29 kg/m    Body mass index is 27.29 kg/m .  Physical Exam   GENERAL: alert and no distress  EYES: Eyes grossly normal to inspection, PERRL and conjunctivae and sclerae normal  RESP: Normal breathing with normal effort satting at 99% on RA  CV: regular rate   ABDOMEN: soft, nontender, no hepatosplenomegaly, no masses and bowel sounds normal  MS: no gross musculoskeletal defects noted, no edema  SKIN: no suspicious lesions or rashes  PSYCH: mentation appears normal, affect normal/bright      Spent 30mins doing chart review, history and exam, patient education, documentation and further activities per the note.The longitudinal plan of care for the diagnosis(es)/condition(s) as documented were addressed during this visit. Due to the added complexity in care, I will continue to support Nicola in the subsequent management and with ongoing continuity of care specifically regarding continued management of patient's vestibular migraines         Signed Electronically by: Altagracia Velásquez MD

## 2025-04-15 NOTE — LETTER
To whom it may concern    I have seen George Hill in my office today on 4/15/25 and I have been evaluating her vestibular migraines and imbalance for which I filled out her [facility] paperwork. Since I saw her last, she has been engaging in vestibular migraine exercises and therapy at home. She also has significantly limited her screen usage and over the last 5 weeks has seen modest improvement of her debilitating symptoms. She is now about 40% better. I ask that she continues her regimen for the next 5 weeks, starting April 22nd. I request that she stays at home and recovers until May 26th.     If you have any questions please do not hesitate to reach out to me her physician.        Sincerely    Electronically signed          Dr Altagracia Velásquez MD  [facility]

## 2025-06-05 ENCOUNTER — TELEPHONE (OUTPATIENT)
Dept: FAMILY MEDICINE | Facility: CLINIC | Age: 40
End: 2025-06-05